# Patient Record
Sex: MALE | Race: WHITE | Employment: OTHER | URBAN - NONMETROPOLITAN AREA
[De-identification: names, ages, dates, MRNs, and addresses within clinical notes are randomized per-mention and may not be internally consistent; named-entity substitution may affect disease eponyms.]

---

## 2021-08-12 ENCOUNTER — HOSPITAL ENCOUNTER (OUTPATIENT)
Age: 71
Setting detail: SPECIMEN
Discharge: HOME | End: 2021-08-12
Payer: MEDICARE

## 2021-08-12 LAB
ALBUMIN SERPL-MCNC: 2.5 G/DL (ref 3.5–5.2)
ALBUMIN/GLOBULIN RATIO: 0.8 (ref 1–2.5)
ALP BLD-CCNC: 147 U/L (ref 40–129)
ALT SERPL-CCNC: 13 U/L (ref 5–41)
ANION GAP SERPL CALCULATED.3IONS-SCNC: 11 MMOL/L (ref 9–17)
AST SERPL-CCNC: 19 U/L
BILIRUB SERPL-MCNC: 0.42 MG/DL (ref 0.3–1.2)
BUN BLDV-MCNC: 11 MG/DL (ref 8–23)
BUN/CREAT BLD: 14 (ref 9–20)
CALCIUM SERPL-MCNC: 7.4 MG/DL (ref 8.6–10.4)
CHLORIDE BLD-SCNC: 98 MMOL/L (ref 98–107)
CO2: 22 MMOL/L (ref 20–31)
CREAT SERPL-MCNC: 0.81 MG/DL (ref 0.7–1.2)
GFR AFRICAN AMERICAN: >60 ML/MIN
GFR NON-AFRICAN AMERICAN: >60 ML/MIN
GFR SERPL CREATININE-BSD FRML MDRD: ABNORMAL ML/MIN/{1.73_M2}
GFR SERPL CREATININE-BSD FRML MDRD: ABNORMAL ML/MIN/{1.73_M2}
GLUCOSE BLD-MCNC: 112 MG/DL (ref 70–99)
POTASSIUM SERPL-SCNC: 4.1 MMOL/L (ref 3.7–5.3)
SODIUM BLD-SCNC: 131 MMOL/L (ref 135–144)
TOTAL PROTEIN: 5.6 G/DL (ref 6.4–8.3)

## 2021-08-12 PROCEDURE — 80053 COMPREHEN METABOLIC PANEL: CPT

## 2021-08-13 ENCOUNTER — HOSPITAL ENCOUNTER (OUTPATIENT)
Age: 71
Setting detail: SPECIMEN
Discharge: HOME | End: 2021-08-13
Payer: MEDICARE

## 2021-08-13 LAB
DATE, STOOL #1: ABNORMAL
DATE, STOOL #2: ABNORMAL
DATE, STOOL #3: ABNORMAL
HEMOCCULT SP1 STL QL: POSITIVE
HEMOCCULT SP2 STL QL: ABNORMAL
HEMOCCULT SP3 STL QL: ABNORMAL
TIME, STOOL #1: 905
TIME, STOOL #2: ABNORMAL
TIME, STOOL #3: ABNORMAL

## 2021-08-13 PROCEDURE — 82272 OCCULT BLD FECES 1-3 TESTS: CPT

## 2021-08-16 ENCOUNTER — HOSPITAL ENCOUNTER (OUTPATIENT)
Age: 71
Setting detail: SPECIMEN
Discharge: HOME | End: 2021-08-16
Payer: MEDICARE

## 2021-08-16 LAB
ABSOLUTE EOS #: 0.18 K/UL (ref 0–0.44)
ABSOLUTE IMMATURE GRANULOCYTE: 0.09 K/UL (ref 0–0.3)
ABSOLUTE LYMPH #: 0.9 K/UL (ref 1.1–3.7)
ABSOLUTE MONO #: 0.09 K/UL (ref 0.1–1.2)
ALBUMIN SERPL-MCNC: 2.4 G/DL (ref 3.5–5.2)
ALBUMIN/GLOBULIN RATIO: 0.8 (ref 1–2.5)
ALP BLD-CCNC: 113 U/L (ref 40–129)
ALT SERPL-CCNC: 12 U/L (ref 5–41)
ANION GAP SERPL CALCULATED.3IONS-SCNC: 12 MMOL/L (ref 9–17)
AST SERPL-CCNC: 15 U/L
BASOPHILS # BLD: 2 % (ref 0–2)
BASOPHILS ABSOLUTE: 0.18 K/UL (ref 0–0.2)
BILIRUB SERPL-MCNC: 0.27 MG/DL (ref 0.3–1.2)
BUN BLDV-MCNC: 9 MG/DL (ref 8–23)
BUN/CREAT BLD: 10 (ref 9–20)
CALCIUM SERPL-MCNC: 7.7 MG/DL (ref 8.6–10.4)
CHLORIDE BLD-SCNC: 97 MMOL/L (ref 98–107)
CO2: 22 MMOL/L (ref 20–31)
CREAT SERPL-MCNC: 0.88 MG/DL (ref 0.7–1.2)
DIFFERENTIAL TYPE: ABNORMAL
EOSINOPHILS RELATIVE PERCENT: 2 % (ref 1–4)
GFR AFRICAN AMERICAN: >60 ML/MIN
GFR NON-AFRICAN AMERICAN: >60 ML/MIN
GFR SERPL CREATININE-BSD FRML MDRD: ABNORMAL ML/MIN/{1.73_M2}
GFR SERPL CREATININE-BSD FRML MDRD: ABNORMAL ML/MIN/{1.73_M2}
GLUCOSE BLD-MCNC: 97 MG/DL (ref 70–99)
HCT VFR BLD CALC: 23.8 % (ref 40.7–50.3)
HEMOGLOBIN: 7.7 G/DL (ref 13–17)
IMMATURE GRANULOCYTES: 1 %
LYMPHOCYTES # BLD: 10 % (ref 24–43)
MCH RBC QN AUTO: 29.2 PG (ref 25.2–33.5)
MCHC RBC AUTO-ENTMCNC: 32.4 G/DL (ref 28.4–34.8)
MCV RBC AUTO: 90.2 FL (ref 82.6–102.9)
MONOCYTES # BLD: 1 % (ref 3–12)
MORPHOLOGY: NORMAL
NRBC AUTOMATED: 0 PER 100 WBC
PDW BLD-RTO: 14.5 % (ref 11.8–14.4)
PLATELET # BLD: 345 K/UL (ref 138–453)
PLATELET ESTIMATE: ABNORMAL
PMV BLD AUTO: 9.1 FL (ref 8.1–13.5)
POTASSIUM SERPL-SCNC: 3.7 MMOL/L (ref 3.7–5.3)
RBC # BLD: 2.64 M/UL (ref 4.21–5.77)
RBC # BLD: ABNORMAL 10*6/UL
SEG NEUTROPHILS: 84 % (ref 36–65)
SEGMENTED NEUTROPHILS ABSOLUTE COUNT: 7.56 K/UL (ref 1.5–8.1)
SODIUM BLD-SCNC: 131 MMOL/L (ref 135–144)
TOTAL PROTEIN: 5.4 G/DL (ref 6.4–8.3)
WBC # BLD: 9 K/UL (ref 3.5–11.3)
WBC # BLD: ABNORMAL 10*3/UL

## 2021-08-16 PROCEDURE — 36415 COLL VENOUS BLD VENIPUNCTURE: CPT

## 2021-08-16 PROCEDURE — P9604 ONE-WAY ALLOW PRORATED TRIP: HCPCS

## 2021-08-16 PROCEDURE — 80053 COMPREHEN METABOLIC PANEL: CPT

## 2021-08-16 PROCEDURE — 85025 COMPLETE CBC W/AUTO DIFF WBC: CPT

## 2021-08-17 ENCOUNTER — TELEPHONE (OUTPATIENT)
Dept: UROLOGY | Age: 71
End: 2021-08-17

## 2021-08-17 PROBLEM — E78.49 OTHER HYPERLIPIDEMIA: Status: ACTIVE | Noted: 2021-08-17

## 2021-08-17 PROBLEM — E03.8 OTHER SPECIFIED HYPOTHYROIDISM: Status: ACTIVE | Noted: 2021-08-17

## 2021-08-17 PROBLEM — H40.1111 PRIMARY OPEN-ANGLE GLAUCOMA, RIGHT EYE, MILD STAGE: Status: ACTIVE | Noted: 2021-08-17

## 2021-08-17 PROBLEM — G89.18 OTHER ACUTE POSTPROCEDURAL PAIN: Status: ACTIVE | Noted: 2021-08-17

## 2021-08-17 PROBLEM — D64.9 ANEMIA, UNSPECIFIED: Status: ACTIVE | Noted: 2021-08-17

## 2021-08-17 PROBLEM — E87.1 HYPONATREMIA: Status: ACTIVE | Noted: 2021-08-17

## 2021-08-17 PROBLEM — R33.9 URINARY RETENTION: Status: ACTIVE | Noted: 2021-08-17

## 2021-08-17 PROBLEM — I48.20 CHRONIC ATRIAL FIBRILLATION (HCC): Status: ACTIVE | Noted: 2021-08-17

## 2021-08-17 PROBLEM — I10 ESSENTIAL HYPERTENSION: Status: ACTIVE | Noted: 2021-08-17

## 2021-08-17 PROBLEM — I71.40 ABDOMINAL AORTIC ANEURYSM (AAA) WITHOUT RUPTURE: Status: ACTIVE | Noted: 2021-08-17

## 2021-08-17 PROBLEM — M62.81 GENERALIZED MUSCLE WEAKNESS: Status: ACTIVE | Noted: 2021-08-17

## 2021-08-17 PROBLEM — I25.9 CHRONIC ISCHEMIC HEART DISEASE: Status: ACTIVE | Noted: 2021-08-17

## 2021-08-17 NOTE — TELEPHONE ENCOUNTER
Rcvd new patient referral from [facility], appointment made for 8/31/2021

## 2021-08-18 RX ORDER — CARVEDILOL 3.12 MG/1
3.12 TABLET ORAL 2 TIMES DAILY WITH MEALS
COMMUNITY

## 2021-08-18 RX ORDER — ACETAMINOPHEN 325 MG/1
650 TABLET ORAL EVERY 4 HOURS PRN
COMMUNITY

## 2021-08-18 RX ORDER — LUTEIN 6 MG
20 TABLET ORAL DAILY
COMMUNITY

## 2021-08-18 RX ORDER — LISINOPRIL 10 MG/1
10 TABLET ORAL DAILY
COMMUNITY

## 2021-08-18 RX ORDER — EVOLOCUMAB 140 MG/ML
140 INJECTION, SOLUTION SUBCUTANEOUS
COMMUNITY
Start: 2021-08-10

## 2021-08-18 RX ORDER — LORATADINE 10 MG/1
10 TABLET ORAL DAILY
COMMUNITY
End: 2021-09-23

## 2021-08-18 RX ORDER — NITROGLYCERIN 0.4 MG/1
0.4 TABLET SUBLINGUAL EVERY 5 MIN PRN
COMMUNITY

## 2021-08-18 RX ORDER — TURMERIC 400 MG
400 CAPSULE ORAL DAILY
COMMUNITY

## 2021-08-18 RX ORDER — APIXABAN 5 MG/1
5 TABLET, FILM COATED ORAL 2 TIMES DAILY
COMMUNITY
Start: 2021-05-25

## 2021-08-18 RX ORDER — DORZOLAMIDE HYDROCHLORIDE AND TIMOLOL MALEATE 20; 5 MG/ML; MG/ML
SOLUTION/ DROPS OPHTHALMIC
COMMUNITY
Start: 2021-06-30

## 2021-08-18 RX ORDER — BRIMONIDINE TARTRATE 2 MG/ML
SOLUTION/ DROPS OPHTHALMIC 3 TIMES DAILY
COMMUNITY
Start: 2021-07-05

## 2021-08-18 RX ORDER — LATANOPROST 50 UG/ML
SOLUTION/ DROPS OPHTHALMIC
COMMUNITY
Start: 2021-06-24

## 2021-08-18 RX ORDER — TAMSULOSIN HYDROCHLORIDE 0.4 MG/1
0.4 CAPSULE ORAL DAILY
COMMUNITY
End: 2021-08-31 | Stop reason: SDUPTHER

## 2021-08-18 RX ORDER — DIGOXIN 125 MCG
125 TABLET ORAL DAILY
COMMUNITY
End: 2021-11-04

## 2021-08-18 RX ORDER — LEVOTHYROXINE SODIUM 150 MCG
150 TABLET ORAL DAILY
COMMUNITY
Start: 2021-08-09

## 2021-08-18 RX ORDER — FOLIC ACID 1 MG/1
1 TABLET ORAL DAILY
COMMUNITY

## 2021-08-18 RX ORDER — LOPERAMIDE HYDROCHLORIDE 2 MG/1
2 CAPSULE ORAL 3 TIMES DAILY PRN
COMMUNITY
End: 2022-01-26 | Stop reason: ALTCHOICE

## 2021-08-31 ENCOUNTER — HOSPITAL ENCOUNTER (OUTPATIENT)
Age: 71
Setting detail: SPECIMEN
Discharge: HOME | End: 2021-08-31
Payer: MEDICARE

## 2021-08-31 ENCOUNTER — OFFICE VISIT (OUTPATIENT)
Dept: UROLOGY | Age: 71
End: 2021-08-31
Payer: MEDICARE

## 2021-08-31 VITALS — SYSTOLIC BLOOD PRESSURE: 110 MMHG | TEMPERATURE: 98.7 F | WEIGHT: 169 LBS | DIASTOLIC BLOOD PRESSURE: 68 MMHG

## 2021-08-31 DIAGNOSIS — R33.9 URINARY RETENTION: ICD-10-CM

## 2021-08-31 DIAGNOSIS — R33.9 URINARY RETENTION: Primary | ICD-10-CM

## 2021-08-31 DIAGNOSIS — N40.1 BENIGN PROSTATIC HYPERPLASIA WITH URINARY RETENTION: ICD-10-CM

## 2021-08-31 DIAGNOSIS — K59.09 OTHER CONSTIPATION: ICD-10-CM

## 2021-08-31 DIAGNOSIS — R33.8 BENIGN PROSTATIC HYPERPLASIA WITH URINARY RETENTION: ICD-10-CM

## 2021-08-31 PROCEDURE — 87186 SC STD MICRODIL/AGAR DIL: CPT

## 2021-08-31 PROCEDURE — 99204 OFFICE O/P NEW MOD 45 MIN: CPT | Performed by: PHYSICIAN ASSISTANT

## 2021-08-31 PROCEDURE — 87086 URINE CULTURE/COLONY COUNT: CPT

## 2021-08-31 PROCEDURE — 86403 PARTICLE AGGLUT ANTBDY SCRN: CPT

## 2021-08-31 RX ORDER — TAMSULOSIN HYDROCHLORIDE 0.4 MG/1
0.4 CAPSULE ORAL 2 TIMES DAILY
Qty: 60 CAPSULE | Refills: 3 | Status: SHIPPED | OUTPATIENT
Start: 2021-08-31 | End: 2021-09-23 | Stop reason: SDUPTHER

## 2021-08-31 RX ORDER — LANOLIN ALCOHOL/MO/W.PET/CERES
325 CREAM (GRAM) TOPICAL
COMMUNITY

## 2021-08-31 RX ORDER — CALCIUM GLUCONATE 45(500) MG
500 TABLET ORAL DAILY
COMMUNITY

## 2021-08-31 RX ORDER — POLYETHYLENE GLYCOL 3350 17 G/17G
17 POWDER, FOR SOLUTION ORAL DAILY
COMMUNITY
End: 2022-01-26 | Stop reason: ALTCHOICE

## 2021-08-31 ASSESSMENT — ENCOUNTER SYMPTOMS
COLOR CHANGE: 0
BACK PAIN: 0
COUGH: 0
SHORTNESS OF BREATH: 0
EYE REDNESS: 0
CONSTIPATION: 1
NAUSEA: 0
VOMITING: 0
WHEEZING: 0
ABDOMINAL PAIN: 0

## 2021-08-31 NOTE — PROGRESS NOTES
HPI:      Patient is a 66 y.o. male in no acute distress. He is alert and oriented to person, place, and time. Patient is a new patient referral from [facility] for urinary retention. Patient has never been seen by urology prior to this. He denies any history of kidney stones. Patient has a history of hypertension, hyperlipidemia, coronary artery disease with drug-eluting stent placement 1/21/2021. Patient had a AAA repair with an endovascular graft 6/2020. Patient had subsequent stenting done on 3/30/2021. Patient continued to have an endoleak. On 7/29/2021 he had an open explantation of the endovascular aneurysm repair with redo AAA repair. Postoperatively he did develop urinary retention and Hurst catheter was placed. He was started on Flomax at the time. (Patient reports that for prior surgery he was started on Flomax but stopped it when he got home as he felt it caused more frequency.)  He was ultimately discharged to [facility]. At [facility] they did make an attempt to remove his catheter on 8/12/2021 but he was unable to urinate therefore the catheter was replaced on 8/13/2021. Patient was recently discharged from [address] with catheter in place on 8/21/2021. He states that he has occasional hematuria in the catheter bag but the catheter is draining well. Patient has not have a bowel movement every day. Patient states that prior to recent procedures he had nocturia x2 and some post void dribbling but this was not bothersome for him.       Past Medical History:   Diagnosis Date    Abdominal aortic aneurysm (AAA) without rupture (HCC)     Anemia, unspecified     Aortoiliac occlusive disease (HCC)     Atrial fibrillation (HCC)     Chronic ischemic heart disease, unspecified     Essential hypertension     Generalized muscle weakness     Graves disease     Hx of thyroid irradiation     Hypo-osmolality and hyponatremia  Hypothyroidism, unspecified     Other acute postprocedural pain     Other hyperlipidemia     Primary open angle glaucoma (POAG) of right eye, mild stage     Primary open angle glaucoma of right eye, mild stage     Urinary retention      Past Surgical History:   Procedure Laterality Date    ABDOMINAL AORTIC ANEURYSM REPAIR, ENDOVASCULAR  06/2020    APPENDECTOMY  2017    CAROTID ENDARTERECTOMY Right 2017    CORONARY ANGIOPLASTY WITH STENT PLACEMENT  01/2021    CC/TEDDY placed    EYE SURGERY Right 10/05/2020    Avastin (Bevacizumab) 1.25mg intravitreal injection OD (right eye)    EYE SURGERY Right 11/09/2020    Panretinal photocoagulation (PRP) OD (right eye)    HERNIA REPAIR      PACEMAKER PLACEMENT  2020    PPM and AICD     Outpatient Encounter Medications as of 8/31/2021   Medication Sig Dispense Refill    calcium gluconate 500 MG tablet Take 500 mg by mouth daily      ferrous sulfate (FE TABS 325) 325 (65 Fe) MG EC tablet Take 325 mg by mouth daily (with breakfast)      polyethylene glycol (MIRALAX) 17 g PACK packet Take 17 g by mouth daily      tamsulosin (FLOMAX) 0.4 MG capsule Take 1 capsule by mouth 2 times daily 60 capsule 3    ELIQUIS 5 MG TABS tablet Take 5 mg by mouth 2 times daily      brimonidine (ALPHAGAN) 0.2 % ophthalmic solution 3 times daily 1 drop in right eye three times daily      dorzolamide-timolol (COSOPT) 22.3-6.8 MG/ML ophthalmic solution 1 drop in right eye twice daily      REPATHA SURECLICK 620 MG/ML SOAJ Inject 140 mg into the skin every 14 days      latanoprost (XALATAN) 0.005 % ophthalmic solution 1 drop right eye at bedtime      SYNTHROID 150 MCG tablet Take 150 mcg by mouth daily      lisinopril (PRINIVIL;ZESTRIL) 10 MG tablet Take 10 mg by mouth daily      loratadine (CLARITIN) 10 MG tablet Take 10 mg by mouth daily      acetaminophen (TYLENOL) 325 MG tablet Take 650 mg by mouth every 4 hours as needed for Pain      Coenzyme Q10-Vitamin E (COQ10-VITAMIN E) 100-10 MG-UNIT CAPS Take 400 mg by mouth daily      Lactobacillus (PROBIOTIC ACIDOPHILUS PO) Take by mouth daily 15 Billion cell cap daily      Turmeric 400 MG CAPS Take 400 mg by mouth daily      ASPIRIN PO Take 81 mg by mouth daily      loperamide (IMODIUM) 2 MG capsule Take 2 mg by mouth 3 times daily as needed for Diarrhea      SODIUM CHLORIDE PO Take 1 g by mouth 3 times daily      carvedilol (COREG) 3.125 MG tablet Take 3.125 mg by mouth 2 times daily (with meals)      digoxin (LANOXIN) 125 MCG tablet Take 125 mcg by mouth daily      folic acid (FOLVITE) 1 MG tablet Take 1 mg by mouth daily      Multiple Vitamin (MULTIVITAMIN ADULT PO) Take by mouth daily      Lutein 20 MG TABS Take 20 mg by mouth daily      nitroGLYCERIN (NITROSTAT) 0.4 MG SL tablet Place 0.4 mg under the tongue every 5 minutes as needed for Chest pain up to max of 3 total doses. If no relief after 1 dose, call 911. (Patient not taking: Reported on 8/31/2021)      [DISCONTINUED] tamsulosin (FLOMAX) 0.4 MG capsule Take 0.4 mg by mouth daily       No facility-administered encounter medications on file as of 8/31/2021.       Current Outpatient Medications on File Prior to Visit   Medication Sig Dispense Refill    calcium gluconate 500 MG tablet Take 500 mg by mouth daily      ferrous sulfate (FE TABS 325) 325 (65 Fe) MG EC tablet Take 325 mg by mouth daily (with breakfast)      polyethylene glycol (MIRALAX) 17 g PACK packet Take 17 g by mouth daily      ELIQUIS 5 MG TABS tablet Take 5 mg by mouth 2 times daily      brimonidine (ALPHAGAN) 0.2 % ophthalmic solution 3 times daily 1 drop in right eye three times daily      dorzolamide-timolol (COSOPT) 22.3-6.8 MG/ML ophthalmic solution 1 drop in right eye twice daily      REPATHA SURECLICK 483 MG/ML SOAJ Inject 140 mg into the skin every 14 days      latanoprost (XALATAN) 0.005 % ophthalmic solution 1 drop right eye at bedtime      SYNTHROID 150 MCG tablet Take 150 mcg by mouth daily      lisinopril (PRINIVIL;ZESTRIL) 10 MG tablet Take 10 mg by mouth daily      loratadine (CLARITIN) 10 MG tablet Take 10 mg by mouth daily      acetaminophen (TYLENOL) 325 MG tablet Take 650 mg by mouth every 4 hours as needed for Pain      Coenzyme Q10-Vitamin E (COQ10-VITAMIN E) 100-10 MG-UNIT CAPS Take 400 mg by mouth daily      Lactobacillus (PROBIOTIC ACIDOPHILUS PO) Take by mouth daily 15 Billion cell cap daily      Turmeric 400 MG CAPS Take 400 mg by mouth daily      ASPIRIN PO Take 81 mg by mouth daily      loperamide (IMODIUM) 2 MG capsule Take 2 mg by mouth 3 times daily as needed for Diarrhea      SODIUM CHLORIDE PO Take 1 g by mouth 3 times daily      carvedilol (COREG) 3.125 MG tablet Take 3.125 mg by mouth 2 times daily (with meals)      digoxin (LANOXIN) 125 MCG tablet Take 125 mcg by mouth daily      folic acid (FOLVITE) 1 MG tablet Take 1 mg by mouth daily      Multiple Vitamin (MULTIVITAMIN ADULT PO) Take by mouth daily      Lutein 20 MG TABS Take 20 mg by mouth daily      nitroGLYCERIN (NITROSTAT) 0.4 MG SL tablet Place 0.4 mg under the tongue every 5 minutes as needed for Chest pain up to max of 3 total doses. If no relief after 1 dose, call 911. (Patient not taking: Reported on 8/31/2021)       No current facility-administered medications on file prior to visit. Glucagon and Ferrous sulfate  History reviewed. No pertinent family history. Social History     Tobacco Use   Smoking Status Former Smoker    Types: Cigarettes   Smokeless Tobacco Never Used       Social History     Substance and Sexual Activity   Alcohol Use Yes    Comment: 2 mixed drinks a week       Review of Systems   Constitutional: Negative for appetite change, chills and fever. Eyes: Negative for redness and visual disturbance. Respiratory: Negative for cough, shortness of breath and wheezing. Cardiovascular: Negative for chest pain and leg swelling. Gastrointestinal: Positive for constipation. Negative for abdominal pain, nausea and vomiting. Genitourinary: Positive for difficulty urinating. Negative for decreased urine volume, discharge, dysuria, enuresis, flank pain, frequency, hematuria, penile pain, scrotal swelling, testicular pain and urgency. Musculoskeletal: Negative for back pain, joint swelling and myalgias. Skin: Negative for color change, rash and wound. Neurological: Negative for dizziness, tremors and numbness. Hematological: Negative for adenopathy. Does not bruise/bleed easily. /68 (Site: Right Upper Arm, Position: Sitting, Cuff Size: Large Adult) Comment: manual  Temp 98.7 °F (37.1 °C) (Temporal)   Wt 169 lb (76.7 kg)       PHYSICAL EXAM:  Constitutional: Patient in no acute distress; Neuro: alert and oriented to person place and time. Psych: Mood and affect normal.  Lungs: Respiratory effort normal  Abdomen: Soft, non-tender, non-distended  Rectal: deferred       Lab Results   Component Value Date    BUN 9 08/16/2021     Lab Results   Component Value Date    CREATININE 0.88 08/16/2021     No results found for: PSA    ASSESSMENT:   Diagnosis Orders   1. Urinary retention  Culture, Urine   2. Benign prostatic hyperplasia with urinary retention  Culture, Urine   3. Other constipation         PLAN:  Increase Flomax to twice a day    Start MiraLAX every day-instructions given    Maintain Hurst catheter for now    Follow-up in 1 week for Hurst catheter removal.  They are aware if the Hurst catheter is not able to stay out secondary to retention we would need to schedule her for a cystoscopy in the future.

## 2021-08-31 NOTE — PATIENT INSTRUCTIONS
FOR CONSTIPATION    It is very important to have regular, soft, daily bowel movements, because it WILL improve your urinary symptoms. Take Miralax (or generic equivalent) 17g once daily (one capful). Take every day, DO NOT skip days, as it must be taken daily in order to be effective. DO NOT take just \"as needed\". It is safe to take long term and is recommended for your symptoms. If one dose daily causes loose stools/diarrhea, decrease to 1/2 capful or 1/4 capful. If you cannot tolerate this medication, please notify our office. If one dose daily of Miralax is not sufficient to produce a soft, easy to pass daily stool, you may also add an over-the-counter stool softener capsule. For example: colace (docusate). For Miralax to have maximal effectiveness, be sure to increase your water intake - aim for 80 oz daily unless you are on a fluid-restriction from another provider. SURVEY:    You may be receiving a survey from [employer] regarding your visit today. Please complete the survey to enable us to provide the highest quality of care to you and your family. If you cannot score us a very good on any question, please call the office to discuss how we could have made your experience a very good one. Thank you.     Your MA today: Harini Waller

## 2021-09-02 ENCOUNTER — TELEPHONE (OUTPATIENT)
Dept: UROLOGY | Age: 71
End: 2021-09-02

## 2021-09-02 LAB
CULTURE: ABNORMAL
Lab: ABNORMAL
SPECIMEN DESCRIPTION: ABNORMAL

## 2021-09-02 RX ORDER — NITROFURANTOIN 25; 75 MG/1; MG/1
100 CAPSULE ORAL 2 TIMES DAILY
Qty: 20 CAPSULE | Refills: 0 | Status: SHIPPED | OUTPATIENT
Start: 2021-09-02 | End: 2021-09-12

## 2021-09-02 NOTE — TELEPHONE ENCOUNTER
Tried to contact the patient multiple times, phone busy. Try again Friday.    (Patient was previously advised that we may not call until Friday)

## 2021-09-02 NOTE — TELEPHONE ENCOUNTER
UACS is positive for infection. I sent in culture specific macrobid. Take this antibiotic until gone. Take this with food and eat yogurt once per day to prevent GI upset. If you develop nausea, vomiting, or fevers call the office or go to the ER. If your urinary symptoms do not improve once completing the antibiotics call our office.

## 2021-09-02 NOTE — TELEPHONE ENCOUNTER
Patient was advised of results/response. He notes that he has been taking the miralax. He has developed severe diarrhea with it. He has decreased his dosage and it currently taking only a quarter of what he is take and he feels that the diarrhea is even worse than yesterday. He is questioning if he should even continue to take it.

## 2021-09-07 ENCOUNTER — OFFICE VISIT (OUTPATIENT)
Dept: UROLOGY | Age: 71
End: 2021-09-07
Payer: MEDICARE

## 2021-09-07 VITALS
DIASTOLIC BLOOD PRESSURE: 82 MMHG | BODY MASS INDEX: 25.61 KG/M2 | TEMPERATURE: 97.3 F | HEIGHT: 68 IN | SYSTOLIC BLOOD PRESSURE: 122 MMHG | WEIGHT: 169 LBS

## 2021-09-07 DIAGNOSIS — R33.8 BENIGN PROSTATIC HYPERPLASIA WITH URINARY RETENTION: ICD-10-CM

## 2021-09-07 DIAGNOSIS — R33.9 URINARY RETENTION: Primary | ICD-10-CM

## 2021-09-07 DIAGNOSIS — N40.1 BENIGN PROSTATIC HYPERPLASIA WITH URINARY RETENTION: ICD-10-CM

## 2021-09-07 PROCEDURE — 51798 US URINE CAPACITY MEASURE: CPT | Performed by: PHYSICIAN ASSISTANT

## 2021-09-07 PROCEDURE — 99213 OFFICE O/P EST LOW 20 MIN: CPT | Performed by: PHYSICIAN ASSISTANT

## 2021-09-07 ASSESSMENT — ENCOUNTER SYMPTOMS
VOMITING: 0
BACK PAIN: 0
DIARRHEA: 1
EYE REDNESS: 0
COUGH: 0
SHORTNESS OF BREATH: 0
CONSTIPATION: 0
ABDOMINAL PAIN: 0
COLOR CHANGE: 0
NAUSEA: 0
WHEEZING: 0

## 2021-09-07 NOTE — PROGRESS NOTES
Patient returned to the office later with complaints of unable to urinate since leaving the office.      Random bladderscan performed in office today:  247 ml

## 2021-09-07 NOTE — PROGRESS NOTES
Pt had de la fuente catheter placed [facility]  for Urinary retention. Balloon deflated on catheter and catheter removed. Patient tolerated procedure well. Pt instructed to drink plenty of fluids, try to urinate q 2 hrs, call office in 6 hrs with update of amount voided, and if not voiding will need to return to office to have de la fuente replaced. Also instructed to return to office or ER if goes >6 hrs without voiding or has strong urge to void/suprapubic discomfort and cannot urinate. Pt verbalizes understanding of plan. F/u 2 weeks.

## 2021-09-07 NOTE — PROGRESS NOTES
HPI:      Patient is a 66 y.o. male in no acute distress. He is alert and oriented to person, place, and time. 8/31/2021   Patient is a new patient referral from [facility] for urinary retention. Patient has never been seen by urology prior to this. He denies any history of kidney stones. Patient has a history of hypertension, hyperlipidemia, coronary artery disease with drug-eluting stent placement 1/21/2021. Patient had a AAA repair with an endovascular graft 6/2020. Patient had subsequent stenting done on 3/30/2021. Patient continued to have an endoleak. On 7/29/2021 he had an open explantation of the endovascular aneurysm repair with redo AAA repair. Postoperatively he did develop urinary retention and Hurst catheter was placed. He was started on Flomax at the time. (Patient reports that for prior surgery he was started on Flomax but stopped it when he got home as he felt it caused more frequency.)  He was ultimately discharged to [facility]. At [facility] they did make an attempt to remove his catheter on 8/12/2021 but he was unable to urinate therefore the catheter was replaced on 8/13/2021. Patient was recently discharged from [address] with catheter in place on 8/21/2021. He states that he has occasional hematuria in the catheter bag but the catheter is draining well. Patient has not have a bowel movement every day. Patient states that prior to recent procedures he had nocturia x2 and some post void dribbling but this was not bothersome for him. Today:  Patient is here today for follow-up urinary retention and BPH. At last visit we increased Flomax twice a day. We also started him on MiraLAX. Patient did have diarrhea with full dose of MiraLAX and was told to reduce it. Patient did reduce it down to a quarter cap but continue to have diarrhea therefore they stopped the medication.   Patient did have a positive urinary tract infection and is currently being treated with culture specific antibiotics. Patient's Hurst catheter was removed today. He denies any fever or chills.     Past Medical History:   Diagnosis Date    Abdominal aortic aneurysm (AAA) without rupture (HCC)     Anemia, unspecified     Aortoiliac occlusive disease (HCC)     Atrial fibrillation (HCC)     Chronic ischemic heart disease, unspecified     Essential hypertension     Generalized muscle weakness     Graves disease     Hx of thyroid irradiation     Hypo-osmolality and hyponatremia     Hypothyroidism, unspecified     Other acute postprocedural pain     Other hyperlipidemia     Primary open angle glaucoma (POAG) of right eye, mild stage     Primary open angle glaucoma of right eye, mild stage     Urinary retention      Past Surgical History:   Procedure Laterality Date    ABDOMINAL AORTIC ANEURYSM REPAIR, ENDOVASCULAR  06/2020    APPENDECTOMY  2017    CAROTID ENDARTERECTOMY Right 2017    CORONARY ANGIOPLASTY WITH STENT PLACEMENT  01/2021    CC/TEDDY placed    EYE SURGERY Right 10/05/2020    Avastin (Bevacizumab) 1.25mg intravitreal injection OD (right eye)    EYE SURGERY Right 11/09/2020    Panretinal photocoagulation (PRP) OD (right eye)    HERNIA REPAIR      PACEMAKER PLACEMENT  2020    PPM and AICD     Outpatient Encounter Medications as of 9/7/2021   Medication Sig Dispense Refill    mupirocin (BACTROBAN) 2 % ointment Apply topically 3 times daily      nitrofurantoin, macrocrystal-monohydrate, (MACROBID) 100 MG capsule Take 1 capsule by mouth 2 times daily for 10 days 20 capsule 0    calcium gluconate 500 MG tablet Take 500 mg by mouth daily      ferrous sulfate (FE TABS 325) 325 (65 Fe) MG EC tablet Take 325 mg by mouth daily (with breakfast)      tamsulosin (FLOMAX) 0.4 MG capsule Take 1 capsule by mouth 2 times daily 60 capsule 3    ELIQUIS 5 MG TABS tablet Take 5 mg by mouth 2 times daily      brimonidine (ALPHAGAN) 0.2 % ophthalmic solution 3 times daily 1 drop in right eye three times daily      dorzolamide-timolol (COSOPT) 22.3-6.8 MG/ML ophthalmic solution 1 drop in right eye twice daily      REPATHA SURECLICK 637 MG/ML SOAJ Inject 140 mg into the skin every 14 days      latanoprost (XALATAN) 0.005 % ophthalmic solution 1 drop right eye at bedtime      SYNTHROID 150 MCG tablet Take 150 mcg by mouth daily      lisinopril (PRINIVIL;ZESTRIL) 10 MG tablet Take 10 mg by mouth daily      loratadine (CLARITIN) 10 MG tablet Take 10 mg by mouth daily      acetaminophen (TYLENOL) 325 MG tablet Take 650 mg by mouth every 4 hours as needed for Pain      Coenzyme Q10-Vitamin E (COQ10-VITAMIN E) 100-10 MG-UNIT CAPS Take 400 mg by mouth daily      Lactobacillus (PROBIOTIC ACIDOPHILUS PO) Take by mouth daily 15 Billion cell cap daily      Turmeric 400 MG CAPS Take 400 mg by mouth daily      ASPIRIN PO Take 81 mg by mouth daily      loperamide (IMODIUM) 2 MG capsule Take 2 mg by mouth 3 times daily as needed for Diarrhea      SODIUM CHLORIDE PO Take 1 g by mouth 3 times daily      carvedilol (COREG) 3.125 MG tablet Take 3.125 mg by mouth 2 times daily (with meals)      digoxin (LANOXIN) 125 MCG tablet Take 125 mcg by mouth daily      folic acid (FOLVITE) 1 MG tablet Take 1 mg by mouth daily      Multiple Vitamin (MULTIVITAMIN ADULT PO) Take by mouth daily      Lutein 20 MG TABS Take 20 mg by mouth daily      polyethylene glycol (MIRALAX) 17 g PACK packet Take 17 g by mouth daily (Patient not taking: Reported on 9/7/2021)      nitroGLYCERIN (NITROSTAT) 0.4 MG SL tablet Place 0.4 mg under the tongue every 5 minutes as needed for Chest pain up to max of 3 total doses. If no relief after 1 dose, call 911. (Patient not taking: Reported on 8/31/2021)       No facility-administered encounter medications on file as of 9/7/2021.       Current Outpatient Medications on File Prior to Visit   Medication Sig Dispense Refill    mupirocin (BACTROBAN) 2 % ointment Apply topically 3 times daily      nitrofurantoin, macrocrystal-monohydrate, (MACROBID) 100 MG capsule Take 1 capsule by mouth 2 times daily for 10 days 20 capsule 0    calcium gluconate 500 MG tablet Take 500 mg by mouth daily      ferrous sulfate (FE TABS 325) 325 (65 Fe) MG EC tablet Take 325 mg by mouth daily (with breakfast)      tamsulosin (FLOMAX) 0.4 MG capsule Take 1 capsule by mouth 2 times daily 60 capsule 3    ELIQUIS 5 MG TABS tablet Take 5 mg by mouth 2 times daily      brimonidine (ALPHAGAN) 0.2 % ophthalmic solution 3 times daily 1 drop in right eye three times daily      dorzolamide-timolol (COSOPT) 22.3-6.8 MG/ML ophthalmic solution 1 drop in right eye twice daily      REPATHA SURECLICK 772 MG/ML SOAJ Inject 140 mg into the skin every 14 days      latanoprost (XALATAN) 0.005 % ophthalmic solution 1 drop right eye at bedtime      SYNTHROID 150 MCG tablet Take 150 mcg by mouth daily      lisinopril (PRINIVIL;ZESTRIL) 10 MG tablet Take 10 mg by mouth daily      loratadine (CLARITIN) 10 MG tablet Take 10 mg by mouth daily      acetaminophen (TYLENOL) 325 MG tablet Take 650 mg by mouth every 4 hours as needed for Pain      Coenzyme Q10-Vitamin E (COQ10-VITAMIN E) 100-10 MG-UNIT CAPS Take 400 mg by mouth daily      Lactobacillus (PROBIOTIC ACIDOPHILUS PO) Take by mouth daily 15 Billion cell cap daily      Turmeric 400 MG CAPS Take 400 mg by mouth daily      ASPIRIN PO Take 81 mg by mouth daily      loperamide (IMODIUM) 2 MG capsule Take 2 mg by mouth 3 times daily as needed for Diarrhea      SODIUM CHLORIDE PO Take 1 g by mouth 3 times daily      carvedilol (COREG) 3.125 MG tablet Take 3.125 mg by mouth 2 times daily (with meals)      digoxin (LANOXIN) 125 MCG tablet Take 125 mcg by mouth daily      folic acid (FOLVITE) 1 MG tablet Take 1 mg by mouth daily      Multiple Vitamin (MULTIVITAMIN ADULT PO) Take by mouth daily      Lutein 20 MG TABS Take 20 mg by mouth daily      polyethylene glycol (MIRALAX) 17 g PACK packet Take 17 g by mouth daily (Patient not taking: Reported on 2021)      nitroGLYCERIN (NITROSTAT) 0.4 MG SL tablet Place 0.4 mg under the tongue every 5 minutes as needed for Chest pain up to max of 3 total doses. If no relief after 1 dose, call 911. (Patient not taking: Reported on 2021)       No current facility-administered medications on file prior to visit. Glucagon and Ferrous sulfate  No family history on file. Social History     Tobacco Use   Smoking Status Former Smoker    Packs/day: 1.00    Types: Cigarettes    Quit date: 2000    Years since quittin.1   Smokeless Tobacco Never Used       Social History     Substance and Sexual Activity   Alcohol Use Yes    Comment: 2 mixed drinks a week       Review of Systems   Constitutional: Negative for appetite change, chills and fever. Eyes: Negative for redness and visual disturbance. Respiratory: Negative for cough, shortness of breath and wheezing. Cardiovascular: Negative for chest pain and leg swelling. Gastrointestinal: Positive for diarrhea. Negative for abdominal pain, constipation, nausea and vomiting. Genitourinary: Negative for decreased urine volume, difficulty urinating, discharge, dysuria, enuresis, flank pain, frequency, hematuria, penile pain, scrotal swelling, testicular pain and urgency. Musculoskeletal: Negative for back pain, joint swelling and myalgias. Skin: Negative for color change, rash and wound. Neurological: Negative for dizziness, tremors and numbness. Hematological: Negative for adenopathy. Does not bruise/bleed easily. /82 (Site: Right Upper Arm, Position: Sitting, Cuff Size: Large Adult)   Temp 97.3 °F (36.3 °C) (Temporal)   Ht 5' 8\" (1.727 m)   Wt 169 lb (76.7 kg)   BMI 25.70 kg/m²       PHYSICAL EXAM:  Constitutional: Patient in no acute distress; Neuro: alert and oriented to person place and time. Psych: Mood and affect normal.  Lungs: Respiratory effort normal  Abdomen: Soft, non-tender, non-distended  Rectal: Deferred    Lab Results   Component Value Date    BUN 9 08/16/2021     Lab Results   Component Value Date    CREATININE 0.88 08/16/2021     No results found for: PSA    ASSESSMENT:   Diagnosis Orders   1. Urinary retention     2. Benign prostatic hyperplasia with urinary retention           PLAN:  Complete course of antibiotics    Continue Flomax twice a day    · Drink plenty of fluids, aim for 80 oz daily for the next few days. · It is normal to feel a little discomfort the next few times you void. · Try to urinate every 2-3 hours while awake (even if you don't feel like you have to void). · If you DO urinate, please record the amount. · If you do NOT urinate within about 6 hours OR if you feel the strong uncomfortable urge to void but are not able - you'll need to come back to the office to get the catheter replaced. · Either way, call our office around 3:30 pm and let us know the void amounts (if you are urinating) or let us know that you're on your way back to the office (if you are not urinating).      If patient develops constipation he needs to resume MiraLAX at 1 teaspoon a day    Follow-up in 2 weeks with [facility],    Patient is aware if Hurst catheter has to be replaced we need to schedule him for a cystoscopy in order to more fully evaluate him

## 2021-09-08 ENCOUNTER — OFFICE VISIT (OUTPATIENT)
Dept: UROLOGY | Age: 71
End: 2021-09-08
Payer: MEDICARE

## 2021-09-08 VITALS
TEMPERATURE: 97.5 F | BODY MASS INDEX: 25.46 KG/M2 | HEART RATE: 77 BPM | DIASTOLIC BLOOD PRESSURE: 69 MMHG | SYSTOLIC BLOOD PRESSURE: 101 MMHG | WEIGHT: 168 LBS | HEIGHT: 68 IN

## 2021-09-08 DIAGNOSIS — R33.8 BENIGN PROSTATIC HYPERPLASIA WITH URINARY RETENTION: ICD-10-CM

## 2021-09-08 DIAGNOSIS — N40.1 BENIGN PROSTATIC HYPERPLASIA WITH URINARY RETENTION: ICD-10-CM

## 2021-09-08 DIAGNOSIS — R33.9 URINARY RETENTION: Primary | ICD-10-CM

## 2021-09-08 PROCEDURE — 99213 OFFICE O/P EST LOW 20 MIN: CPT | Performed by: PHYSICIAN ASSISTANT

## 2021-09-08 PROCEDURE — 51798 US URINE CAPACITY MEASURE: CPT | Performed by: PHYSICIAN ASSISTANT

## 2021-09-08 ASSESSMENT — ENCOUNTER SYMPTOMS
DIARRHEA: 1
COLOR CHANGE: 0
VOMITING: 0
NAUSEA: 0
ABDOMINAL PAIN: 0
WHEEZING: 0
EYE REDNESS: 0
COUGH: 0
BACK PAIN: 0
SHORTNESS OF BREATH: 0
CONSTIPATION: 0

## 2021-09-08 NOTE — PROGRESS NOTES
HPI:      Patient is a 66 y.o. male in no acute distress. He is alert and oriented to person, place, and time. 8/31/2021   Patient is a new patient referral from [facility] for urinary retention. Patient has never been seen by urology prior to this. He denies any history of kidney stones. Patient has a history of hypertension, hyperlipidemia, coronary artery disease with drug-eluting stent placement 1/21/2021. Patient had a AAA repair with an endovascular graft 6/2020. Patient had subsequent stenting done on 3/30/2021. Patient continued to have an endoleak. On 7/29/2021 he had an open explantation of the endovascular aneurysm repair with redo AAA repair. Postoperatively he did develop urinary retention and Hurst catheter was placed. He was started on Flomax at the time. (Patient reports that for prior surgery he was started on Flomax but stopped it when he got home as he felt it caused more frequency.)  He was ultimately discharged to [facility]. At [facility] they did make an attempt to remove his catheter on 8/12/2021 but he was unable to urinate therefore the catheter was replaced on 8/13/2021. Patient was recently discharged from [address] with catheter in place on 8/21/2021. He states that he has occasional hematuria in the catheter bag but the catheter is draining well. Patient has not have a bowel movement every day. Patient states that prior to recent procedures he had nocturia x2 and some post void dribbling but this was not bothersome for him. Today:  Patient is here today for follow-up urinary retention and BPH. Patient continues take Flomax twice a day. Patient did have a Hurst catheter removed yesterday. He returned to our office yesterday afternoon as he was unable to urinate any significant quantity. Patient was having multiple bouts of diarrhea and he was uncertain whether or not he voided urine at the same time. Yesterday afternoon his random bladder scan was 247 mL. He was completely pain-free. We did offer to see him this morning for repeat bladder scan but he did have multiple appointments at the [facility] clinic. Patient returns this afternoon for PVR. Patient last voided 30 mins ago, scan = 67 mL. Denies any fever, chills, gross hematuria, flank pain, dysuria.     Past Medical History:   Diagnosis Date    Abdominal aortic aneurysm (AAA) without rupture (HCC)     Anemia, unspecified     Aortoiliac occlusive disease (HCC)     Atrial fibrillation (HCC)     Chronic ischemic heart disease, unspecified     Essential hypertension     Generalized muscle weakness     Graves disease     Hx of thyroid irradiation     Hypo-osmolality and hyponatremia     Hypothyroidism, unspecified     Other acute postprocedural pain     Other hyperlipidemia     Primary open angle glaucoma (POAG) of right eye, mild stage     Primary open angle glaucoma of right eye, mild stage     Urinary retention      Past Surgical History:   Procedure Laterality Date    ABDOMINAL AORTIC ANEURYSM REPAIR, ENDOVASCULAR  06/2020    APPENDECTOMY  2017    CAROTID ENDARTERECTOMY Right 2017    CORONARY ANGIOPLASTY WITH STENT PLACEMENT  01/2021    CC/TEDDY placed    EYE SURGERY Right 10/05/2020    Avastin (Bevacizumab) 1.25mg intravitreal injection OD (right eye)    EYE SURGERY Right 11/09/2020    Panretinal photocoagulation (PRP) OD (right eye)    HERNIA REPAIR      PACEMAKER PLACEMENT  2020    PPM and AICD     Outpatient Encounter Medications as of 9/8/2021   Medication Sig Dispense Refill    mupirocin (BACTROBAN) 2 % ointment Apply topically 3 times daily      nitrofurantoin, macrocrystal-monohydrate, (MACROBID) 100 MG capsule Take 1 capsule by mouth 2 times daily for 10 days 20 capsule 0    calcium gluconate 500 MG tablet Take 500 mg by mouth daily      ferrous sulfate (FE TABS 325) 325 (65 Fe) MG EC tablet Take 325 mg by mouth daily (with breakfast)      polyethylene glycol (MIRALAX) 17 g PACK packet Take 17 g by mouth daily       tamsulosin (FLOMAX) 0.4 MG capsule Take 1 capsule by mouth 2 times daily 60 capsule 3    ELIQUIS 5 MG TABS tablet Take 5 mg by mouth 2 times daily      brimonidine (ALPHAGAN) 0.2 % ophthalmic solution 3 times daily 1 drop in right eye three times daily      dorzolamide-timolol (COSOPT) 22.3-6.8 MG/ML ophthalmic solution 1 drop in right eye twice daily      REPATHA SURECLICK 902 MG/ML SOAJ Inject 140 mg into the skin every 14 days      latanoprost (XALATAN) 0.005 % ophthalmic solution 1 drop right eye at bedtime      SYNTHROID 150 MCG tablet Take 150 mcg by mouth daily      lisinopril (PRINIVIL;ZESTRIL) 10 MG tablet Take 10 mg by mouth daily      loratadine (CLARITIN) 10 MG tablet Take 10 mg by mouth daily      acetaminophen (TYLENOL) 325 MG tablet Take 650 mg by mouth every 4 hours as needed for Pain      nitroGLYCERIN (NITROSTAT) 0.4 MG SL tablet Place 0.4 mg under the tongue every 5 minutes as needed for Chest pain up to max of 3 total doses. If no relief after 1 dose, call 911.        Coenzyme Q10-Vitamin E (COQ10-VITAMIN E) 100-10 MG-UNIT CAPS Take 400 mg by mouth daily      Lactobacillus (PROBIOTIC ACIDOPHILUS PO) Take by mouth daily 15 Billion cell cap daily      Turmeric 400 MG CAPS Take 400 mg by mouth daily      ASPIRIN PO Take 81 mg by mouth daily      loperamide (IMODIUM) 2 MG capsule Take 2 mg by mouth 3 times daily as needed for Diarrhea      SODIUM CHLORIDE PO Take 1 g by mouth 3 times daily      carvedilol (COREG) 3.125 MG tablet Take 3.125 mg by mouth 2 times daily (with meals)      digoxin (LANOXIN) 125 MCG tablet Take 125 mcg by mouth daily      folic acid (FOLVITE) 1 MG tablet Take 1 mg by mouth daily      Multiple Vitamin (MULTIVITAMIN ADULT PO) Take by mouth daily      Lutein 20 MG TABS Take 20 mg by mouth daily       No facility-administered encounter medications on file as of 9/8/2021. Current Outpatient Medications on File Prior to Visit   Medication Sig Dispense Refill    mupirocin (BACTROBAN) 2 % ointment Apply topically 3 times daily      nitrofurantoin, macrocrystal-monohydrate, (MACROBID) 100 MG capsule Take 1 capsule by mouth 2 times daily for 10 days 20 capsule 0    calcium gluconate 500 MG tablet Take 500 mg by mouth daily      ferrous sulfate (FE TABS 325) 325 (65 Fe) MG EC tablet Take 325 mg by mouth daily (with breakfast)      polyethylene glycol (MIRALAX) 17 g PACK packet Take 17 g by mouth daily       tamsulosin (FLOMAX) 0.4 MG capsule Take 1 capsule by mouth 2 times daily 60 capsule 3    ELIQUIS 5 MG TABS tablet Take 5 mg by mouth 2 times daily      brimonidine (ALPHAGAN) 0.2 % ophthalmic solution 3 times daily 1 drop in right eye three times daily      dorzolamide-timolol (COSOPT) 22.3-6.8 MG/ML ophthalmic solution 1 drop in right eye twice daily      REPATHA SURECLICK 625 MG/ML SOAJ Inject 140 mg into the skin every 14 days      latanoprost (XALATAN) 0.005 % ophthalmic solution 1 drop right eye at bedtime      SYNTHROID 150 MCG tablet Take 150 mcg by mouth daily      lisinopril (PRINIVIL;ZESTRIL) 10 MG tablet Take 10 mg by mouth daily      loratadine (CLARITIN) 10 MG tablet Take 10 mg by mouth daily      acetaminophen (TYLENOL) 325 MG tablet Take 650 mg by mouth every 4 hours as needed for Pain      nitroGLYCERIN (NITROSTAT) 0.4 MG SL tablet Place 0.4 mg under the tongue every 5 minutes as needed for Chest pain up to max of 3 total doses. If no relief after 1 dose, call 911.        Coenzyme Q10-Vitamin E (COQ10-VITAMIN E) 100-10 MG-UNIT CAPS Take 400 mg by mouth daily      Lactobacillus (PROBIOTIC ACIDOPHILUS PO) Take by mouth daily 15 Billion cell cap daily      Turmeric 400 MG CAPS Take 400 mg by mouth daily      ASPIRIN PO Take 81 mg by mouth daily      loperamide (IMODIUM) 2 MG capsule Take 2 mg by mouth 3 times daily as needed for Diarrhea  SODIUM CHLORIDE PO Take 1 g by mouth 3 times daily      carvedilol (COREG) 3.125 MG tablet Take 3.125 mg by mouth 2 times daily (with meals)      digoxin (LANOXIN) 125 MCG tablet Take 125 mcg by mouth daily      folic acid (FOLVITE) 1 MG tablet Take 1 mg by mouth daily      Multiple Vitamin (MULTIVITAMIN ADULT PO) Take by mouth daily      Lutein 20 MG TABS Take 20 mg by mouth daily       No current facility-administered medications on file prior to visit. Glucagon and Ferrous sulfate  History reviewed. No pertinent family history. Social History     Tobacco Use   Smoking Status Former Smoker    Packs/day: 1.00    Types: Cigarettes    Quit date: 2000    Years since quittin.1   Smokeless Tobacco Never Used       Social History     Substance and Sexual Activity   Alcohol Use Yes    Comment: 2 mixed drinks a week       Review of Systems   Constitutional: Negative for appetite change, chills and fever. Eyes: Negative for redness and visual disturbance. Respiratory: Negative for cough, shortness of breath and wheezing. Cardiovascular: Negative for chest pain and leg swelling. Gastrointestinal: Positive for diarrhea. Negative for abdominal pain, constipation, nausea and vomiting. Genitourinary: Negative for decreased urine volume, difficulty urinating, discharge, dysuria, enuresis, flank pain, frequency, hematuria, penile pain, scrotal swelling, testicular pain and urgency. Musculoskeletal: Negative for back pain, joint swelling and myalgias. Skin: Negative for color change, rash and wound. Neurological: Negative for dizziness, tremors and numbness. Hematological: Negative for adenopathy. Does not bruise/bleed easily.        /69 (Site: Right Upper Arm, Position: Sitting, Cuff Size: Medium Adult)   Pulse 77   Temp 97.5 °F (36.4 °C) (Infrared)   Ht 5' 8\" (1.727 m)   Wt 168 lb (76.2 kg)   BMI 25.54 kg/m²       PHYSICAL EXAM:  Constitutional: Patient in no acute distress; Neuro: alert and oriented to person place and time. Psych: Mood and affect normal.  Lungs: Respiratory effort normal  Abdomen: Soft, non-tender, non-distended  Rectal: Deferred      Lab Results   Component Value Date    BUN 9 08/16/2021     Lab Results   Component Value Date    CREATININE 0.88 08/16/2021     No results found for: PSA    ASSESSMENT:   Diagnosis Orders   1. Urinary retention     2. Benign prostatic hyperplasia with urinary retention  MN MEASUREMENT,POST-VOID RESIDUAL VOLUME BY US,NON-IMAGING       PLAN:  Follow-up as scheduled in 2-week with PVR    Continue Flomax twice a day    Advised patient to see PCP in regards to his diarrhea.

## 2021-09-23 ENCOUNTER — OFFICE VISIT (OUTPATIENT)
Dept: UROLOGY | Age: 71
End: 2021-09-23
Payer: MEDICARE

## 2021-09-23 ENCOUNTER — HOSPITAL ENCOUNTER (OUTPATIENT)
Age: 71
Setting detail: SPECIMEN
Discharge: HOME | End: 2021-09-23
Payer: MEDICARE

## 2021-09-23 VITALS — BODY MASS INDEX: 25.54 KG/M2 | WEIGHT: 168 LBS | DIASTOLIC BLOOD PRESSURE: 68 MMHG | SYSTOLIC BLOOD PRESSURE: 98 MMHG

## 2021-09-23 DIAGNOSIS — N40.1 BPH WITH OBSTRUCTION/LOWER URINARY TRACT SYMPTOMS: ICD-10-CM

## 2021-09-23 DIAGNOSIS — R33.9 URINARY RETENTION: ICD-10-CM

## 2021-09-23 DIAGNOSIS — R33.9 INCOMPLETE BLADDER EMPTYING: ICD-10-CM

## 2021-09-23 DIAGNOSIS — N13.8 BPH WITH OBSTRUCTION/LOWER URINARY TRACT SYMPTOMS: ICD-10-CM

## 2021-09-23 DIAGNOSIS — R33.9 INCOMPLETE BLADDER EMPTYING: Primary | ICD-10-CM

## 2021-09-23 LAB
-: ABNORMAL
AMORPHOUS: ABNORMAL
BACTERIA: ABNORMAL
BILIRUBIN URINE: NEGATIVE
CASTS UA: ABNORMAL /LPF
COLOR: YELLOW
COMMENT UA: ABNORMAL
CRYSTALS, UA: ABNORMAL /HPF
EPITHELIAL CELLS UA: ABNORMAL /HPF (ref 0–5)
GLUCOSE URINE: NEGATIVE
KETONES, URINE: NEGATIVE
LEUKOCYTE ESTERASE, URINE: NEGATIVE
MUCUS: ABNORMAL
NITRITE, URINE: NEGATIVE
OTHER OBSERVATIONS UA: ABNORMAL
PH UA: 5.5 (ref 5–9)
PROTEIN UA: NEGATIVE
RBC UA: ABNORMAL /HPF (ref 0–2)
RENAL EPITHELIAL, UA: ABNORMAL /HPF
SPECIFIC GRAVITY UA: 1.01 (ref 1.01–1.02)
TRICHOMONAS: ABNORMAL
TURBIDITY: CLEAR
URINE HGB: NEGATIVE
UROBILINOGEN, URINE: NORMAL
WBC UA: ABNORMAL /HPF (ref 0–5)
YEAST: ABNORMAL

## 2021-09-23 PROCEDURE — 51798 US URINE CAPACITY MEASURE: CPT | Performed by: NURSE PRACTITIONER

## 2021-09-23 PROCEDURE — 87086 URINE CULTURE/COLONY COUNT: CPT

## 2021-09-23 PROCEDURE — 81001 URINALYSIS AUTO W/SCOPE: CPT

## 2021-09-23 PROCEDURE — 99214 OFFICE O/P EST MOD 30 MIN: CPT | Performed by: NURSE PRACTITIONER

## 2021-09-23 RX ORDER — TAMSULOSIN HYDROCHLORIDE 0.4 MG/1
0.4 CAPSULE ORAL 2 TIMES DAILY
Qty: 60 CAPSULE | Refills: 3 | Status: SHIPPED | OUTPATIENT
Start: 2021-09-23 | End: 2021-12-09 | Stop reason: SDUPTHER

## 2021-09-23 ASSESSMENT — ENCOUNTER SYMPTOMS
CONSTIPATION: 0
VOMITING: 0
NAUSEA: 0
SHORTNESS OF BREATH: 0
WHEEZING: 0
ABDOMINAL PAIN: 0
COUGH: 0
EYE REDNESS: 0
BACK PAIN: 0
COLOR CHANGE: 0

## 2021-09-23 NOTE — PROGRESS NOTES
HPI:          Patient is a 66 y.o. male in no acute distress. He is alert and oriented to person, place, and time. History  7/2021 developed postoperative retention following a AAA repair with an endovascular graft and open exploration of endovascular AAA repair in 2 to continuous endoleak. Started on Flomax    8/2021 referral from [facility] for urinary retention. Rehab facility attempted to remove the Hurst catheter, but patient was unable to urinate   Increased Flomax to twice per day    Today  Here today to follow-up for urinary retention. We did remove his Hurst catheter 2 weeks ago. He is taking Flomax twice per day. He states his bowels are moving daily. He has nocturia x2. He urinates every 2 hours during the day. He denies incontinence or gross hematuria. His PVR is 238 mL.     Past Medical History:   Diagnosis Date    Abdominal aortic aneurysm (AAA) without rupture (HCC)     Anemia, unspecified     Aortoiliac occlusive disease (HCC)     Atrial fibrillation (HCC)     Chronic ischemic heart disease, unspecified     Essential hypertension     Generalized muscle weakness     Graves disease     Hx of thyroid irradiation     Hypo-osmolality and hyponatremia     Hypothyroidism, unspecified     Other acute postprocedural pain     Other hyperlipidemia     Primary open angle glaucoma (POAG) of right eye, mild stage     Primary open angle glaucoma of right eye, mild stage     Urinary retention      Past Surgical History:   Procedure Laterality Date    ABDOMINAL AORTIC ANEURYSM REPAIR, ENDOVASCULAR  06/2020    APPENDECTOMY  2017    CAROTID ENDARTERECTOMY Right 2017    CORONARY ANGIOPLASTY WITH STENT PLACEMENT  01/2021    CC/TEDDY placed    EYE SURGERY Right 10/05/2020    Avastin (Bevacizumab) 1.25mg intravitreal injection OD (right eye)    EYE SURGERY Right 11/09/2020    Panretinal photocoagulation (PRP) OD (right eye)    HERNIA REPAIR      PACEMAKER PLACEMENT  2020    PPM and AICD     Outpatient Encounter Medications as of 9/23/2021   Medication Sig Dispense Refill    tamsulosin (FLOMAX) 0.4 MG capsule Take 1 capsule by mouth 2 times daily 60 capsule 3    mupirocin (BACTROBAN) 2 % ointment Apply topically 3 times daily      calcium gluconate 500 MG tablet Take 500 mg by mouth daily      ferrous sulfate (FE TABS 325) 325 (65 Fe) MG EC tablet Take 325 mg by mouth daily (with breakfast)      polyethylene glycol (MIRALAX) 17 g PACK packet Take 17 g by mouth daily       ELIQUIS 5 MG TABS tablet Take 5 mg by mouth 2 times daily      brimonidine (ALPHAGAN) 0.2 % ophthalmic solution 3 times daily 1 drop in right eye three times daily      dorzolamide-timolol (COSOPT) 22.3-6.8 MG/ML ophthalmic solution 1 drop in right eye twice daily      REPATHA SURECLICK 806 MG/ML SOAJ Inject 140 mg into the skin every 14 days      latanoprost (XALATAN) 0.005 % ophthalmic solution 1 drop right eye at bedtime      SYNTHROID 150 MCG tablet Take 150 mcg by mouth daily      lisinopril (PRINIVIL;ZESTRIL) 10 MG tablet Take 10 mg by mouth daily      acetaminophen (TYLENOL) 325 MG tablet Take 650 mg by mouth every 4 hours as needed for Pain      nitroGLYCERIN (NITROSTAT) 0.4 MG SL tablet Place 0.4 mg under the tongue every 5 minutes as needed for Chest pain up to max of 3 total doses. If no relief after 1 dose, call 911.        Coenzyme Q10-Vitamin E (COQ10-VITAMIN E) 100-10 MG-UNIT CAPS Take 400 mg by mouth daily      Lactobacillus (PROBIOTIC ACIDOPHILUS PO) Take by mouth daily 15 Billion cell cap daily      Turmeric 400 MG CAPS Take 400 mg by mouth daily      ASPIRIN PO Take 81 mg by mouth daily      loperamide (IMODIUM) 2 MG capsule Take 2 mg by mouth 3 times daily as needed for Diarrhea      SODIUM CHLORIDE PO Take 1 g by mouth 3 times daily      carvedilol (COREG) 3.125 MG tablet Take 3.125 mg by mouth 2 times daily (with meals)      digoxin (LANOXIN) 125 MCG tablet Take 125 mcg by mouth daily      folic acid (FOLVITE) 1 MG tablet Take 1 mg by mouth daily      Multiple Vitamin (MULTIVITAMIN ADULT PO) Take by mouth daily      Lutein 20 MG TABS Take 20 mg by mouth daily      [DISCONTINUED] tamsulosin (FLOMAX) 0.4 MG capsule Take 1 capsule by mouth 2 times daily 60 capsule 3    [DISCONTINUED] loratadine (CLARITIN) 10 MG tablet Take 10 mg by mouth daily       No facility-administered encounter medications on file as of 9/23/2021. Current Outpatient Medications on File Prior to Visit   Medication Sig Dispense Refill    mupirocin (BACTROBAN) 2 % ointment Apply topically 3 times daily      calcium gluconate 500 MG tablet Take 500 mg by mouth daily      ferrous sulfate (FE TABS 325) 325 (65 Fe) MG EC tablet Take 325 mg by mouth daily (with breakfast)      polyethylene glycol (MIRALAX) 17 g PACK packet Take 17 g by mouth daily       ELIQUIS 5 MG TABS tablet Take 5 mg by mouth 2 times daily      brimonidine (ALPHAGAN) 0.2 % ophthalmic solution 3 times daily 1 drop in right eye three times daily      dorzolamide-timolol (COSOPT) 22.3-6.8 MG/ML ophthalmic solution 1 drop in right eye twice daily      REPATHA SURECLICK 214 MG/ML SOAJ Inject 140 mg into the skin every 14 days      latanoprost (XALATAN) 0.005 % ophthalmic solution 1 drop right eye at bedtime      SYNTHROID 150 MCG tablet Take 150 mcg by mouth daily      lisinopril (PRINIVIL;ZESTRIL) 10 MG tablet Take 10 mg by mouth daily      acetaminophen (TYLENOL) 325 MG tablet Take 650 mg by mouth every 4 hours as needed for Pain      nitroGLYCERIN (NITROSTAT) 0.4 MG SL tablet Place 0.4 mg under the tongue every 5 minutes as needed for Chest pain up to max of 3 total doses. If no relief after 1 dose, call 911.        Coenzyme Q10-Vitamin E (COQ10-VITAMIN E) 100-10 MG-UNIT CAPS Take 400 mg by mouth daily      Lactobacillus (PROBIOTIC ACIDOPHILUS PO) Take by mouth daily 15 Billion cell cap daily      Turmeric 400 MG CAPS Take 400 mg by mouth daily      ASPIRIN PO Take 81 mg by mouth daily      loperamide (IMODIUM) 2 MG capsule Take 2 mg by mouth 3 times daily as needed for Diarrhea      SODIUM CHLORIDE PO Take 1 g by mouth 3 times daily      carvedilol (COREG) 3.125 MG tablet Take 3.125 mg by mouth 2 times daily (with meals)      digoxin (LANOXIN) 125 MCG tablet Take 125 mcg by mouth daily      folic acid (FOLVITE) 1 MG tablet Take 1 mg by mouth daily      Multiple Vitamin (MULTIVITAMIN ADULT PO) Take by mouth daily      Lutein 20 MG TABS Take 20 mg by mouth daily       No current facility-administered medications on file prior to visit. Glucagon and Ferrous sulfate  History reviewed. No pertinent family history. Social History     Tobacco Use   Smoking Status Former Smoker    Packs/day: 1.00    Types: Cigarettes    Quit date: 2000    Years since quittin.1   Smokeless Tobacco Never Used       Social History     Substance and Sexual Activity   Alcohol Use Yes    Comment: 2 mixed drinks a week       Review of Systems   Constitutional: Negative for appetite change, chills and fever. Eyes: Negative for redness and visual disturbance. Respiratory: Negative for cough, shortness of breath and wheezing. Cardiovascular: Negative for chest pain and leg swelling. Gastrointestinal: Negative for abdominal pain, constipation, nausea and vomiting. Genitourinary: Negative for decreased urine volume, difficulty urinating, discharge, dysuria, enuresis, flank pain, frequency, hematuria, penile pain, scrotal swelling, testicular pain and urgency. Musculoskeletal: Negative for back pain, joint swelling and myalgias. Skin: Negative for color change, rash and wound. Neurological: Negative for dizziness, tremors and numbness. Hematological: Negative for adenopathy. Does not bruise/bleed easily.        BP 98/68 (Site: Right Upper Arm, Position: Sitting, Cuff Size: Medium Adult)   Wt 168 lb (76.2 kg)   BMI 25.54 kg/m² PHYSICAL EXAM:  Constitutional: Patient in no acute distress; Neuro: alert and oriented to person place and time. Psych: Mood and affect normal.  Skin: Normal  Lungs: Respiratory effort normal  Cardiovascular:  Normal peripheral pulses  Abdomen: Soft, non-tender, non-distended with no CVA, flank pain  Bladder non-tender and not distended. Lab Results   Component Value Date    BUN 9 08/16/2021     Lab Results   Component Value Date    CREATININE 0.88 08/16/2021     No results found for: PSA    ASSESSMENT:   Diagnosis Orders   1. Incomplete bladder emptying  UT MEASUREMENT,POST-VOID RESIDUAL VOLUME BY US,NON-IMAGING    Urinalysis with Microscopic    Culture, Urine   2. Urinary retention  UT MEASUREMENT,POST-VOID RESIDUAL VOLUME BY US,NON-IMAGING    Urinalysis with Microscopic    Culture, Urine   3.  BPH with obstruction/lower urinary tract symptoms           PLAN:  We will check a UA C&S    Continue Flomax twice per day    Due to incomplete bladder emptying we will proceed with cystoscopy

## 2021-09-24 LAB
CULTURE: NO GROWTH
Lab: NORMAL
SPECIMEN DESCRIPTION: NORMAL

## 2021-09-27 ENCOUNTER — TELEPHONE (OUTPATIENT)
Dept: UROLOGY | Age: 71
End: 2021-09-27

## 2021-09-27 NOTE — TELEPHONE ENCOUNTER
----- Message from TOAN Daley - CNP sent at 9/27/2021  9:01 AM EDT -----  Call pt - urine cx reviewed and negative for UTI & for significant microhematuria

## 2021-10-14 ENCOUNTER — PROCEDURE VISIT (OUTPATIENT)
Dept: UROLOGY | Age: 71
End: 2021-10-14
Payer: MEDICARE

## 2021-10-14 VITALS
DIASTOLIC BLOOD PRESSURE: 85 MMHG | HEIGHT: 68 IN | SYSTOLIC BLOOD PRESSURE: 155 MMHG | BODY MASS INDEX: 25.46 KG/M2 | TEMPERATURE: 97.5 F | HEART RATE: 80 BPM | WEIGHT: 168 LBS

## 2021-10-14 DIAGNOSIS — R33.9 URINARY RETENTION: ICD-10-CM

## 2021-10-14 DIAGNOSIS — N40.1 BPH WITH OBSTRUCTION/LOWER URINARY TRACT SYMPTOMS: Primary | ICD-10-CM

## 2021-10-14 DIAGNOSIS — N13.8 BPH WITH OBSTRUCTION/LOWER URINARY TRACT SYMPTOMS: Primary | ICD-10-CM

## 2021-10-14 PROCEDURE — 99213 OFFICE O/P EST LOW 20 MIN: CPT | Performed by: UROLOGY

## 2021-10-14 PROCEDURE — 52000 CYSTOURETHROSCOPY: CPT | Performed by: UROLOGY

## 2021-10-14 RX ORDER — B-COMPLEX WITH VITAMIN C
1 TABLET ORAL
COMMUNITY
End: 2022-01-26 | Stop reason: ALTCHOICE

## 2021-10-14 NOTE — PATIENT INSTRUCTIONS
SURVEY:    You may be receiving a survey from [employer] regarding your visit today. Please complete the survey to enable us to provide the highest quality of care to you and your family. If you cannot score us a very good on any question, please call the office to discuss how we could of made your experience a very good one. Thank you.

## 2021-10-14 NOTE — PROGRESS NOTES
HPI:          Patient is a 66 y.o. male in no acute distress. He is alert and oriented to person, place, and time. History  7/2021 developed postoperative retention following a AAA repair with an endovascular graft and open exploration of endovascular AAA repair in 2 to continuous endoleak. Started on Flomax     [facility] Rehab facility attempted to remove the Hurst catheter, but patient was unable to urinate              Increased Flomax to twice per day      Currently  Patient is here today for lower tract visualization. Patient does have a history of BPH. He also has a history of urinary retention and inability to empty his bladder. Patient has been on Flomax twice daily since last visit. Patient did have recent lab work done. His creatinine was 0.82. Patient's BUN was 7. All other electrolytes are normal.  Patient is voiding approximately 2 hours during the day. He is voiding several times at night. He is using a urinal.  He is happy with this. Cystoscopy Procedure Note    Pre-operative Diagnosis: LUTs    Post-operative Diagnosis: Same     Surgeon: Kaylan Lora    Assistants: None    Anesthesia : Local    Procedure Details   The risks, benefits, complications, treatment options, and expected outcomes were discussed with the patient. The patient concurred with the proposed plan, giving informed consent. Cystoscopy was performed today under local anesthesia, using sterile technique. The patient was placed in the dorsal lithotomy position, prepped with CHG, and draped in the usual sterile fashion. A 14 [place] flexible cystoscope was used to systematically inspect both the urethra and bladder in their entirety. Findings:  Anterior urethra: normal without strictures  Hyperplasia: Bilobar  Bladder: Normal mucosa, without lesions.   Ureteral orifice(s) was/were seen in the normal position and effluxing clear urine  Trabeculations No  Diverticulum No  Description: Bilobar hyperplasia with high riding bladder neck, minimal hyperplasia         Specimens: Cytology/urine culture No                 Complications:  None; patient tolerated the procedure well.            Disposition: home           Condition: stable      Past Medical History:   Diagnosis Date    Abdominal aortic aneurysm (AAA) without rupture (HCC)     Anemia, unspecified     Aortoiliac occlusive disease (HCC)     Atrial fibrillation (HCC)     Chronic ischemic heart disease, unspecified     Essential hypertension     Generalized muscle weakness     Graves disease     Hx of thyroid irradiation     Hypo-osmolality and hyponatremia     Hypothyroidism, unspecified     Other acute postprocedural pain     Other hyperlipidemia     Primary open angle glaucoma (POAG) of right eye, mild stage     Primary open angle glaucoma of right eye, mild stage     Urinary retention      Past Surgical History:   Procedure Laterality Date    ABDOMINAL AORTIC ANEURYSM REPAIR, ENDOVASCULAR  06/2020    APPENDECTOMY  2017    CAROTID ENDARTERECTOMY Right 2017    CORONARY ANGIOPLASTY WITH STENT PLACEMENT  01/2021    CC/TEDDY placed    EYE SURGERY Right 10/05/2020    Avastin (Bevacizumab) 1.25mg intravitreal injection OD (right eye)    EYE SURGERY Right 11/09/2020    Panretinal photocoagulation (PRP) OD (right eye)    HERNIA REPAIR      PACEMAKER PLACEMENT  2020    PPM and AICD     Outpatient Encounter Medications as of 10/14/2021   Medication Sig Dispense Refill    tamsulosin (FLOMAX) 0.4 MG capsule Take 1 capsule by mouth 2 times daily 60 capsule 3    mupirocin (BACTROBAN) 2 % ointment Apply topically 3 times daily      calcium gluconate 500 MG tablet Take 500 mg by mouth daily      ferrous sulfate (FE TABS 325) 325 (65 Fe) MG EC tablet Take 325 mg by mouth daily (with breakfast)      polyethylene glycol (MIRALAX) 17 g PACK packet Take 17 g by mouth daily       ELIQUIS 5 MG TABS tablet Take 5 mg by mouth 2 times daily      brimonidine (ALPHAGAN) 0.2 % ophthalmic solution 3 times daily 1 drop in right eye three times daily      dorzolamide-timolol (COSOPT) 22.3-6.8 MG/ML ophthalmic solution 1 drop in right eye twice daily      REPATHA SURECLICK 427 MG/ML SOAJ Inject 140 mg into the skin every 14 days      latanoprost (XALATAN) 0.005 % ophthalmic solution 1 drop right eye at bedtime      SYNTHROID 150 MCG tablet Take 150 mcg by mouth daily      lisinopril (PRINIVIL;ZESTRIL) 10 MG tablet Take 10 mg by mouth daily      acetaminophen (TYLENOL) 325 MG tablet Take 650 mg by mouth every 4 hours as needed for Pain      nitroGLYCERIN (NITROSTAT) 0.4 MG SL tablet Place 0.4 mg under the tongue every 5 minutes as needed for Chest pain up to max of 3 total doses. If no relief after 1 dose, call 911.  Coenzyme Q10-Vitamin E (COQ10-VITAMIN E) 100-10 MG-UNIT CAPS Take 400 mg by mouth daily      Lactobacillus (PROBIOTIC ACIDOPHILUS PO) Take by mouth daily 15 Billion cell cap daily      Turmeric 400 MG CAPS Take 400 mg by mouth daily      ASPIRIN PO Take 81 mg by mouth daily      loperamide (IMODIUM) 2 MG capsule Take 2 mg by mouth 3 times daily as needed for Diarrhea      SODIUM CHLORIDE PO Take 1 g by mouth 3 times daily      carvedilol (COREG) 3.125 MG tablet Take 3.125 mg by mouth 2 times daily (with meals)      digoxin (LANOXIN) 125 MCG tablet Take 125 mcg by mouth daily      folic acid (FOLVITE) 1 MG tablet Take 1 mg by mouth daily      Multiple Vitamin (MULTIVITAMIN ADULT PO) Take by mouth daily      Lutein 20 MG TABS Take 20 mg by mouth daily       No facility-administered encounter medications on file as of 10/14/2021.       Current Outpatient Medications on File Prior to Visit   Medication Sig Dispense Refill    tamsulosin (FLOMAX) 0.4 MG capsule Take 1 capsule by mouth 2 times daily 60 capsule 3    mupirocin (BACTROBAN) 2 % ointment Apply topically 3 times daily      calcium gluconate 500 MG tablet Take 500 mg by mouth daily      ferrous sulfate (FE TABS 325) 325 (65 Fe) MG EC tablet Take 325 mg by mouth daily (with breakfast)      polyethylene glycol (MIRALAX) 17 g PACK packet Take 17 g by mouth daily       ELIQUIS 5 MG TABS tablet Take 5 mg by mouth 2 times daily      brimonidine (ALPHAGAN) 0.2 % ophthalmic solution 3 times daily 1 drop in right eye three times daily      dorzolamide-timolol (COSOPT) 22.3-6.8 MG/ML ophthalmic solution 1 drop in right eye twice daily      REPATHA SURECLICK 309 MG/ML SOAJ Inject 140 mg into the skin every 14 days      latanoprost (XALATAN) 0.005 % ophthalmic solution 1 drop right eye at bedtime      SYNTHROID 150 MCG tablet Take 150 mcg by mouth daily      lisinopril (PRINIVIL;ZESTRIL) 10 MG tablet Take 10 mg by mouth daily      acetaminophen (TYLENOL) 325 MG tablet Take 650 mg by mouth every 4 hours as needed for Pain      nitroGLYCERIN (NITROSTAT) 0.4 MG SL tablet Place 0.4 mg under the tongue every 5 minutes as needed for Chest pain up to max of 3 total doses. If no relief after 1 dose, call 911.  Coenzyme Q10-Vitamin E (COQ10-VITAMIN E) 100-10 MG-UNIT CAPS Take 400 mg by mouth daily      Lactobacillus (PROBIOTIC ACIDOPHILUS PO) Take by mouth daily 15 Billion cell cap daily      Turmeric 400 MG CAPS Take 400 mg by mouth daily      ASPIRIN PO Take 81 mg by mouth daily      loperamide (IMODIUM) 2 MG capsule Take 2 mg by mouth 3 times daily as needed for Diarrhea      SODIUM CHLORIDE PO Take 1 g by mouth 3 times daily      carvedilol (COREG) 3.125 MG tablet Take 3.125 mg by mouth 2 times daily (with meals)      digoxin (LANOXIN) 125 MCG tablet Take 125 mcg by mouth daily      folic acid (FOLVITE) 1 MG tablet Take 1 mg by mouth daily      Multiple Vitamin (MULTIVITAMIN ADULT PO) Take by mouth daily      Lutein 20 MG TABS Take 20 mg by mouth daily       No current facility-administered medications on file prior to visit. Glucagon and Ferrous sulfate  No family history on file. Social History     Tobacco Use   Smoking Status Former Smoker    Packs/day: 1.00    Types: Cigarettes    Quit date: 2000    Years since quittin.2   Smokeless Tobacco Never Used       Social History     Substance and Sexual Activity   Alcohol Use Yes    Comment: 2 mixed drinks a week       Review of Systems    There were no vitals taken for this visit. PHYSICAL EXAM:  Constitutional: Patient in no acute distress; Neuro: alert and oriented to person place and time. Psych: Mood and affect normal.  Skin: Normal  Lungs: Respiratory effort normal  Cardiovascular:  Normal peripheral pulses  Abdomen: Soft, non-tender, non-distended with no CVA, flank pain  Bladder non-tender and not distended. Lymphatics: no palpable lymphadenopathy  Penis normal  Urethral meatus normal  Scrotal exam normal  Testicles normal bilaterally  Epididymis normal bilaterally  No evidence of inguinal hernia      Lab Results   Component Value Date    BUN 9 2021     Lab Results   Component Value Date    CREATININE 0.88 2021     No results found for: PSA    ASSESSMENT:  This is a 66 y.o. male with the following diagnoses:   Diagnosis Orders   1. BPH with obstruction/lower urinary tract symptoms  [place] CYSTOURETHROSCOPY   2. Urinary retention  [place] CYSTOURETHROSCOPY       PLAN:  I did offer patient PVP greenlight today. He did decline. He will continue on with his twice daily Flomax. We will see him back in 6 months. We will repeat his PVR when he turns. We will do a lab check when he returns if we have not seen any recent renal studies. We will order some. He will call us in the interim with difficulties.

## 2021-10-14 NOTE — PROGRESS NOTES
During cystoscopy the following was utilized on patient with no adverse affects:    45% SODIUM CHLORIDE 500 ML BAG  Lot number: [id number]  Expiration date: [address] 2%   Lot number: [id number]  Expiration date: 3-23

## 2021-11-04 ENCOUNTER — HOSPITAL ENCOUNTER (OUTPATIENT)
Age: 71
Setting detail: SPECIMEN
Discharge: HOME | End: 2021-11-04
Payer: MEDICARE

## 2021-11-04 ENCOUNTER — OFFICE VISIT (OUTPATIENT)
Dept: UROLOGY | Age: 71
End: 2021-11-04
Payer: MEDICARE

## 2021-11-04 VITALS — DIASTOLIC BLOOD PRESSURE: 70 MMHG | BODY MASS INDEX: 24.78 KG/M2 | WEIGHT: 163 LBS | SYSTOLIC BLOOD PRESSURE: 100 MMHG

## 2021-11-04 DIAGNOSIS — R33.9 URINARY RETENTION: Primary | ICD-10-CM

## 2021-11-04 DIAGNOSIS — N40.1 BPH WITH OBSTRUCTION/LOWER URINARY TRACT SYMPTOMS: ICD-10-CM

## 2021-11-04 DIAGNOSIS — R30.0 DYSURIA: ICD-10-CM

## 2021-11-04 DIAGNOSIS — R33.9 URINARY RETENTION: ICD-10-CM

## 2021-11-04 DIAGNOSIS — N13.8 BPH WITH OBSTRUCTION/LOWER URINARY TRACT SYMPTOMS: ICD-10-CM

## 2021-11-04 PROCEDURE — 51798 US URINE CAPACITY MEASURE: CPT | Performed by: NURSE PRACTITIONER

## 2021-11-04 PROCEDURE — 87077 CULTURE AEROBIC IDENTIFY: CPT

## 2021-11-04 PROCEDURE — 51702 INSERT TEMP BLADDER CATH: CPT | Performed by: NURSE PRACTITIONER

## 2021-11-04 PROCEDURE — 87186 SC STD MICRODIL/AGAR DIL: CPT

## 2021-11-04 PROCEDURE — 99214 OFFICE O/P EST MOD 30 MIN: CPT | Performed by: NURSE PRACTITIONER

## 2021-11-04 PROCEDURE — 87086 URINE CULTURE/COLONY COUNT: CPT

## 2021-11-04 RX ORDER — NITROFURANTOIN 25; 75 MG/1; MG/1
100 CAPSULE ORAL 2 TIMES DAILY
Qty: 28 CAPSULE | Refills: 0 | Status: SHIPPED
Start: 2021-11-04 | End: 2021-11-08 | Stop reason: ALTCHOICE

## 2021-11-04 RX ORDER — BRIMONIDINE TARTRATE 1.5 MG/ML
SOLUTION/ DROPS OPHTHALMIC
COMMUNITY
Start: 2021-10-19 | End: 2021-11-04 | Stop reason: SDUPTHER

## 2021-11-04 NOTE — PROGRESS NOTES
Random bladderscan performed in office today:  Pt last voided 3 hours ago, scan = 155 mL    New 16F de la fuente catheter placed into the bladder via urethra under sterile technique without difficulty, with 10 mL sterile water instilled into balloon. Return of 150 mL urine. Pt tolerated catheter insertion well. Pt was instructed on catheter care and sent home with printed information. Pt advised to call office if develops pain or fever, leaking around catheter, if catheter stops draining, or for any concerns.     CATHETER:  Lot number [id number]    LIDOCAINE JELLY 2%  Lot number [id number]  Exp date 3-23    NORMAL SALINE  Lot number [id number]  Exp date 1-1-2023

## 2021-11-04 NOTE — PROGRESS NOTES
HPI:          Patient is a 66 y.o. male in no acute distress. He is alert and oriented to person, place, and time. History  7/2021 developed postoperative retention following a AAA repair with an endovascular graft and open exploration of endovascular AAA repair in 2 to continuous endoleak. Started on Flomax    [facility] Rehab facility attempted to remove the Hurst catheter, but patient was unable to urinate   Increased Flomax to twice per day    10/2021 cystoscopy showed bilobar hyperplasia with high riding bladder neck, minimal hyperplasia   PVP greenlight offered, but patient declined    Today  Here today with complaints of decreased urine flow, only dribbling to urinate, and dysuria. His symptoms started on Monday. He is taking Flomax twice per day. He states his bowels are moving daily. He has nocturia x2. He urinated 3 hours ago, but only has 155 mL in his bladder. He denies fevers, nausea or vomiting. He denies gross hematuria. He did have a cystoscopy on 10/14/2021. This did show bilobar hyperplasia with high riding bladder neck.     Past Medical History:   Diagnosis Date    Abdominal aortic aneurysm (AAA) without rupture (HCC)     Anemia, unspecified     Aortoiliac occlusive disease (HCC)     Atrial fibrillation (HCC)     Chronic ischemic heart disease, unspecified     Essential hypertension     Generalized muscle weakness     Graves disease     Hx of thyroid irradiation     Hypo-osmolality and hyponatremia     Hypothyroidism, unspecified     Other acute postprocedural pain     Other hyperlipidemia     Primary open angle glaucoma (POAG) of right eye, mild stage     Primary open angle glaucoma of right eye, mild stage     Urinary retention      Past Surgical History:   Procedure Laterality Date    ABDOMINAL AORTIC ANEURYSM REPAIR, ENDOVASCULAR  06/2020    APPENDECTOMY  2017    CAROTID ENDARTERECTOMY Right 2017    CORONARY ANGIOPLASTY WITH STENT PLACEMENT  01/2021    CC/TEDDY placed    EYE SURGERY Right 10/05/2020    Avastin (Bevacizumab) 1.25mg intravitreal injection OD (right eye)    EYE SURGERY Right 11/09/2020    Panretinal photocoagulation (PRP) OD (right eye)    HERNIA REPAIR      PACEMAKER PLACEMENT  2020    PPM and AICD     Outpatient Encounter Medications as of 11/4/2021   Medication Sig Dispense Refill    [DISCONTINUED] nitrofurantoin, macrocrystal-monohydrate, (MACROBID) 100 MG capsule Take 1 capsule by mouth 2 times daily for 14 days 28 capsule 0    Calcium Carbonate-Vitamin D (OYSTER SHELL CALCIUM/D) 500-200 MG-UNIT TABS Take 1 tablet by mouth 3 times daily (with meals)      tamsulosin (FLOMAX) 0.4 MG capsule Take 1 capsule by mouth 2 times daily 60 capsule 3    mupirocin (BACTROBAN) 2 % ointment Apply topically 3 times daily      calcium gluconate 500 MG tablet Take 500 mg by mouth daily      ferrous sulfate (FE TABS 325) 325 (65 Fe) MG EC tablet Take 325 mg by mouth daily (with breakfast)      polyethylene glycol (MIRALAX) 17 g PACK packet Take 17 g by mouth daily       ELIQUIS 5 MG TABS tablet Take 5 mg by mouth 2 times daily      brimonidine (ALPHAGAN) 0.2 % ophthalmic solution 3 times daily 1 drop in right eye three times daily      dorzolamide-timolol (COSOPT) 22.3-6.8 MG/ML ophthalmic solution 1 drop in right eye twice daily      REPATHA SURECLICK 041 MG/ML SOAJ Inject 140 mg into the skin every 14 days      latanoprost (XALATAN) 0.005 % ophthalmic solution 1 drop right eye at bedtime      SYNTHROID 150 MCG tablet Take 150 mcg by mouth daily      lisinopril (PRINIVIL;ZESTRIL) 10 MG tablet Take 10 mg by mouth daily      acetaminophen (TYLENOL) 325 MG tablet Take 650 mg by mouth every 4 hours as needed for Pain      nitroGLYCERIN (NITROSTAT) 0.4 MG SL tablet Place 0.4 mg under the tongue every 5 minutes as needed for Chest pain up to max of 3 total doses.  If no relief after 1 dose, call 911.       Coenzyme Q10-Vitamin E (COQ10-VITAMIN E) 100-10 MG-UNIT CAPS Take 400 mg by mouth daily      Lactobacillus (PROBIOTIC ACIDOPHILUS PO) Take by mouth daily 15 Billion cell cap daily      Turmeric 400 MG CAPS Take 400 mg by mouth daily      ASPIRIN PO Take 81 mg by mouth daily      loperamide (IMODIUM) 2 MG capsule Take 2 mg by mouth 3 times daily as needed for Diarrhea      SODIUM CHLORIDE PO Take 1 g by mouth 3 times daily      carvedilol (COREG) 3.125 MG tablet Take 3.125 mg by mouth 2 times daily (with meals)      folic acid (FOLVITE) 1 MG tablet Take 1 mg by mouth daily      Multiple Vitamin (MULTIVITAMIN ADULT PO) Take by mouth daily      Lutein 20 MG TABS Take 20 mg by mouth daily      [DISCONTINUED] ALPHAGAN P 0.15 % ophthalmic solution       [DISCONTINUED] digoxin (LANOXIN) 125 MCG tablet Take 125 mcg by mouth daily (Patient not taking: Reported on 10/14/2021)       No facility-administered encounter medications on file as of 11/4/2021.       Current Outpatient Medications on File Prior to Visit   Medication Sig Dispense Refill    Calcium Carbonate-Vitamin D (OYSTER SHELL CALCIUM/D) 500-200 MG-UNIT TABS Take 1 tablet by mouth 3 times daily (with meals)      tamsulosin (FLOMAX) 0.4 MG capsule Take 1 capsule by mouth 2 times daily 60 capsule 3    mupirocin (BACTROBAN) 2 % ointment Apply topically 3 times daily      calcium gluconate 500 MG tablet Take 500 mg by mouth daily      ferrous sulfate (FE TABS 325) 325 (65 Fe) MG EC tablet Take 325 mg by mouth daily (with breakfast)      polyethylene glycol (MIRALAX) 17 g PACK packet Take 17 g by mouth daily       ELIQUIS 5 MG TABS tablet Take 5 mg by mouth 2 times daily      brimonidine (ALPHAGAN) 0.2 % ophthalmic solution 3 times daily 1 drop in right eye three times daily      dorzolamide-timolol (COSOPT) 22.3-6.8 MG/ML ophthalmic solution 1 drop in right eye twice daily      REPATHA SURECLICK 501 MG/ML SOAJ Inject 140 mg into the skin every 14 days      latanoprost (XALATAN) 0.005 % ophthalmic solution 1 drop right eye at bedtime      SYNTHROID 150 MCG tablet Take 150 mcg by mouth daily      lisinopril (PRINIVIL;ZESTRIL) 10 MG tablet Take 10 mg by mouth daily      acetaminophen (TYLENOL) 325 MG tablet Take 650 mg by mouth every 4 hours as needed for Pain      nitroGLYCERIN (NITROSTAT) 0.4 MG SL tablet Place 0.4 mg under the tongue every 5 minutes as needed for Chest pain up to max of 3 total doses. If no relief after 1 dose, call 911.  Coenzyme Q10-Vitamin E (COQ10-VITAMIN E) 100-10 MG-UNIT CAPS Take 400 mg by mouth daily      Lactobacillus (PROBIOTIC ACIDOPHILUS PO) Take by mouth daily 15 Billion cell cap daily      Turmeric 400 MG CAPS Take 400 mg by mouth daily      ASPIRIN PO Take 81 mg by mouth daily      loperamide (IMODIUM) 2 MG capsule Take 2 mg by mouth 3 times daily as needed for Diarrhea      SODIUM CHLORIDE PO Take 1 g by mouth 3 times daily      carvedilol (COREG) 3.125 MG tablet Take 3.125 mg by mouth 2 times daily (with meals)      folic acid (FOLVITE) 1 MG tablet Take 1 mg by mouth daily      Multiple Vitamin (MULTIVITAMIN ADULT PO) Take by mouth daily      Lutein 20 MG TABS Take 20 mg by mouth daily       No current facility-administered medications on file prior to visit. Glucagon and Ferrous sulfate  History reviewed. No pertinent family history. Social History     Tobacco Use   Smoking Status Former Smoker    Packs/day: 1.00    Types: Cigarettes    Quit date: 2000    Years since quittin.3   Smokeless Tobacco Never Used       Social History     Substance and Sexual Activity   Alcohol Use Yes    Comment: 2 mixed drinks a week       Review of Systems   Constitutional: Negative for appetite change, chills and fever. Eyes: Negative for redness and visual disturbance. Respiratory: Negative for cough, shortness of breath and wheezing.     Cardiovascular: Negative for chest pain and leg swelling. Gastrointestinal: Negative for abdominal pain, constipation, nausea and vomiting. Genitourinary: Positive for decreased urine volume, difficulty urinating and dysuria. Negative for enuresis, flank pain, frequency, hematuria, penile discharge, penile pain, scrotal swelling, testicular pain and urgency. Musculoskeletal: Negative for back pain, joint swelling and myalgias. Skin: Negative for color change, rash and wound. Neurological: Negative for dizziness, tremors and numbness. Hematological: Negative for adenopathy. Does not bruise/bleed easily. /70 (Site: Right Upper Arm, Position: Sitting, Cuff Size: Medium Adult)   Wt 163 lb (73.9 kg)   BMI 24.78 kg/m²       PHYSICAL EXAM:  Constitutional: Patient in no acute distress; Neuro: alert and oriented to person place and time. Psych: Mood and affect normal.  Skin: Normal  Lungs: Respiratory effort normal  Cardiovascular:  Normal peripheral pulses  Abdomen: Soft, non-tender, non-distended with no CVA, flank pain  Bladder non-tender and not distended. Lymphatics: no palpable lymphadenopathy  Penis normal      Lab Results   Component Value Date    BUN 9 08/16/2021     Lab Results   Component Value Date    CREATININE 0.88 08/16/2021     No results found for: PSA    ASSESSMENT:   Diagnosis Orders   1. Urinary retention  UT MEASUREMENT,POST-VOID RESIDUAL VOLUME BY US,NON-IMAGING    Culture, Urine    UT INSERT,TEMP INDWELLING BLAD CATH,SIMPLE   2. BPH with obstruction/lower urinary tract symptoms  UT MEASUREMENT,POST-VOID RESIDUAL VOLUME BY US,NON-IMAGING    Culture, Urine    UT INSERT,TEMP INDWELLING BLAD CATH,SIMPLE   3. Dysuria  UT MEASUREMENT,POST-VOID RESIDUAL VOLUME BY US,NON-IMAGING    Culture, Urine    UT INSERT,TEMP INDWELLING BLAD CATH,SIMPLE         PLAN:  We will check a UA C&S. We will start empiric Macrobid. This was culture specific according to his prior culture.     I gave the patient the option of learning how to perform intermittent catheterization, placing a Hurst catheter, or monitoring his symptoms. He did opt to place a Hurst catheter. However, he is concerned because his wife is being inducted into the volleyball [employer] [address] next weekend and he would like to be present for that without a catheter. Pending the culture results we will try to remove the catheter early next week. We discussed proceeding with PVP greenlight. We will determine this at follow-up.

## 2021-11-06 LAB
CULTURE: ABNORMAL
Lab: ABNORMAL
SPECIMEN DESCRIPTION: ABNORMAL

## 2021-11-08 ENCOUNTER — TELEPHONE (OUTPATIENT)
Dept: UROLOGY | Age: 71
End: 2021-11-08

## 2021-11-08 RX ORDER — CIPROFLOXACIN 500 MG/1
500 TABLET, FILM COATED ORAL 2 TIMES DAILY
Qty: 20 TABLET | Refills: 0 | Status: SHIPPED | OUTPATIENT
Start: 2021-11-08 | End: 2021-11-18

## 2021-11-08 ASSESSMENT — ENCOUNTER SYMPTOMS
BACK PAIN: 0
SHORTNESS OF BREATH: 0
COUGH: 0
NAUSEA: 0
EYE REDNESS: 0
COLOR CHANGE: 0
WHEEZING: 0
VOMITING: 0
ABDOMINAL PAIN: 0
CONSTIPATION: 0

## 2021-11-08 NOTE — TELEPHONE ENCOUNTER
Urine culture is positive for infection. STOP macrobid. Start cipro. Take this antibiotic until gone. Take this with food and eat yogurt once per day to prevent GI upset. If you develop nausea, vomiting, or fevers call the office or go to the ER. If your urinary symptoms do not improve once completing the antibiotics call our office. Plan to remove catheter Wednesday morning early.

## 2021-11-08 NOTE — TELEPHONE ENCOUNTER
Patient made aware of positive urine culture results and to start antibiotics.  Patient also scheduled for catheter removal.

## 2021-11-10 ENCOUNTER — TELEPHONE (OUTPATIENT)
Dept: UROLOGY | Age: 71
End: 2021-11-10

## 2021-11-10 ENCOUNTER — OFFICE VISIT (OUTPATIENT)
Dept: UROLOGY | Age: 71
End: 2021-11-10
Payer: MEDICARE

## 2021-11-10 VITALS
SYSTOLIC BLOOD PRESSURE: 117 MMHG | OXYGEN SATURATION: 97 % | BODY MASS INDEX: 25.01 KG/M2 | DIASTOLIC BLOOD PRESSURE: 76 MMHG | WEIGHT: 165 LBS | HEIGHT: 68 IN | HEART RATE: 72 BPM | TEMPERATURE: 96.9 F

## 2021-11-10 DIAGNOSIS — N13.8 BPH WITH OBSTRUCTION/LOWER URINARY TRACT SYMPTOMS: Primary | ICD-10-CM

## 2021-11-10 DIAGNOSIS — N40.1 BPH WITH OBSTRUCTION/LOWER URINARY TRACT SYMPTOMS: Primary | ICD-10-CM

## 2021-11-10 DIAGNOSIS — R33.9 URINARY RETENTION: ICD-10-CM

## 2021-11-10 DIAGNOSIS — K59.09 OTHER CONSTIPATION: ICD-10-CM

## 2021-11-10 PROCEDURE — 99214 OFFICE O/P EST MOD 30 MIN: CPT | Performed by: PHYSICIAN ASSISTANT

## 2021-11-10 ASSESSMENT — ENCOUNTER SYMPTOMS
EYE REDNESS: 0
COUGH: 0
CONSTIPATION: 1
DIARRHEA: 1
SHORTNESS OF BREATH: 0
NAUSEA: 0
ABDOMINAL PAIN: 0
WHEEZING: 0
COLOR CHANGE: 0
VOMITING: 0
BACK PAIN: 0

## 2021-11-10 NOTE — PROGRESS NOTES
Catheter balloon was deflated and catheter was removed from the urethra with no adverse reactions. Patient tolerated the procedure well.

## 2021-11-10 NOTE — TELEPHONE ENCOUNTER
Patient called to report that since his catheter removal this AM, he has drank 90 oz of fluid and has voided twice (80 oz and 120oz). Last void was at 3pm. Patient denies feeling full or uncomfortable. Patient advised to call the office or go to the ER if having difficulty with urination or unable to void. Starts with fever/chills, nausea or vomiting. Patient voiced understanding.

## 2021-11-10 NOTE — PROGRESS NOTES
HPI:      Patient is a 66 y.o. male in no acute distress. He is alert and oriented to person, place, and time. History  7/2021 developed postoperative retention following a AAA repair with an endovascular graft and open exploration of endovascular AAA repair in 2 to continuous endoleak. Started on Flomax     8/2021 referral from [facility] for urinary retention. Rehab facility attempted to remove the Hurst catheter, but patient was unable to urinate              Increased Flomax to twice per day     10/2021 cystoscopy showed bilobar hyperplasia with high riding bladder neck, minimal hyperplasia              PVP greenlight offered, but patient declined     Today  Patient is here today for follow-up urinary retention. At last visit patient opted to have Hurst catheter placed. Patient did have an Enterobacter urinary tract infection. Patient was changed to course of oral Cipro. Patient is currently on Cipro. Patient did have a cystoscopy last month and was offered a PVP greenlight but declined. Patient is currently on Flomax twice a day. Patient is not having a daily bowel movement. Patient states that he is issues with intermittent diarrhea ever since he had aortic surgery. He states that he has an upcoming colonoscopy. Patient states that he takes Imodium. He states that approximately 10 days a month he has diarrhea. He states that approximately 10 days he has stools that he feels are normal but the stools look like \"little balls\". 5 days out of the month he does not have a bowel movement. The other 5 days he has to strain to go. Patient is adverse to having a Hurst catheter put back in or intermittent cathing.   He also does not want to have any surgeries.     Past Medical History:   Diagnosis Date    Abdominal aortic aneurysm (AAA) without rupture (HCC)     Anemia, unspecified     Aortoiliac occlusive disease (HCC)     Atrial fibrillation (HCC)     Chronic ischemic heart disease, unspecified     Essential hypertension     Generalized muscle weakness     Graves disease     Hx of thyroid irradiation     Hypo-osmolality and hyponatremia     Hypothyroidism, unspecified     Other acute postprocedural pain     Other hyperlipidemia     Primary open angle glaucoma (POAG) of right eye, mild stage     Primary open angle glaucoma of right eye, mild stage     Urinary retention      Past Surgical History:   Procedure Laterality Date    ABDOMINAL AORTIC ANEURYSM REPAIR, ENDOVASCULAR  06/2020    APPENDECTOMY  2017    CAROTID ENDARTERECTOMY Right 2017    CORONARY ANGIOPLASTY WITH STENT PLACEMENT  01/2021    CC/TEDDY placed    EYE SURGERY Right 10/05/2020    Avastin (Bevacizumab) 1.25mg intravitreal injection OD (right eye)    EYE SURGERY Right 11/09/2020    Panretinal photocoagulation (PRP) OD (right eye)    HERNIA REPAIR      PACEMAKER PLACEMENT  2020    PPM and AICD     Outpatient Encounter Medications as of 11/10/2021   Medication Sig Dispense Refill    ciprofloxacin (CIPRO) 500 MG tablet Take 1 tablet by mouth 2 times daily for 10 days 20 tablet 0    Calcium Carbonate-Vitamin D (OYSTER SHELL CALCIUM/D) 500-200 MG-UNIT TABS Take 1 tablet by mouth 3 times daily (with meals)      tamsulosin (FLOMAX) 0.4 MG capsule Take 1 capsule by mouth 2 times daily 60 capsule 3    mupirocin (BACTROBAN) 2 % ointment Apply topically 3 times daily      calcium gluconate 500 MG tablet Take 500 mg by mouth daily      ferrous sulfate (FE TABS 325) 325 (65 Fe) MG EC tablet Take 325 mg by mouth daily (with breakfast)      polyethylene glycol (MIRALAX) 17 g PACK packet Take 17 g by mouth daily       ELIQUIS 5 MG TABS tablet Take 5 mg by mouth 2 times daily      brimonidine (ALPHAGAN) 0.2 % ophthalmic solution 3 times daily 1 drop in right eye three times daily      dorzolamide-timolol (COSOPT) 22.3-6.8 MG/ML ophthalmic solution 1 drop in right eye twice daily      REPATHA SURECLICK 378 MG/ML SOAJ Inject 140 mg into the skin every 14 days      latanoprost (XALATAN) 0.005 % ophthalmic solution 1 drop right eye at bedtime      SYNTHROID 150 MCG tablet Take 150 mcg by mouth daily      lisinopril (PRINIVIL;ZESTRIL) 10 MG tablet Take 10 mg by mouth daily      acetaminophen (TYLENOL) 325 MG tablet Take 650 mg by mouth every 4 hours as needed for Pain      nitroGLYCERIN (NITROSTAT) 0.4 MG SL tablet Place 0.4 mg under the tongue every 5 minutes as needed for Chest pain up to max of 3 total doses. If no relief after 1 dose, call 911.  Coenzyme Q10-Vitamin E (COQ10-VITAMIN E) 100-10 MG-UNIT CAPS Take 400 mg by mouth daily      Lactobacillus (PROBIOTIC ACIDOPHILUS PO) Take by mouth daily 15 Billion cell cap daily      Turmeric 400 MG CAPS Take 400 mg by mouth daily      ASPIRIN PO Take 81 mg by mouth daily      loperamide (IMODIUM) 2 MG capsule Take 2 mg by mouth 3 times daily as needed for Diarrhea      SODIUM CHLORIDE PO Take 1 g by mouth 3 times daily      carvedilol (COREG) 3.125 MG tablet Take 3.125 mg by mouth 2 times daily (with meals)      folic acid (FOLVITE) 1 MG tablet Take 1 mg by mouth daily      Multiple Vitamin (MULTIVITAMIN ADULT PO) Take by mouth daily      Lutein 20 MG TABS Take 20 mg by mouth daily       No facility-administered encounter medications on file as of 11/10/2021.       Current Outpatient Medications on File Prior to Visit   Medication Sig Dispense Refill    ciprofloxacin (CIPRO) 500 MG tablet Take 1 tablet by mouth 2 times daily for 10 days 20 tablet 0    Calcium Carbonate-Vitamin D (OYSTER SHELL CALCIUM/D) 500-200 MG-UNIT TABS Take 1 tablet by mouth 3 times daily (with meals)      tamsulosin (FLOMAX) 0.4 MG capsule Take 1 capsule by mouth 2 times daily 60 capsule 3    mupirocin (BACTROBAN) 2 % ointment Apply topically 3 times daily      calcium gluconate 500 MG tablet Take 500 mg by mouth daily      ferrous sulfate (FE TABS 325) 325 (65 Fe) MG EC tablet Take 325 mg by mouth daily (with breakfast)      polyethylene glycol (MIRALAX) 17 g PACK packet Take 17 g by mouth daily       ELIQUIS 5 MG TABS tablet Take 5 mg by mouth 2 times daily      brimonidine (ALPHAGAN) 0.2 % ophthalmic solution 3 times daily 1 drop in right eye three times daily      dorzolamide-timolol (COSOPT) 22.3-6.8 MG/ML ophthalmic solution 1 drop in right eye twice daily      REPATHA SURECLICK 465 MG/ML SOAJ Inject 140 mg into the skin every 14 days      latanoprost (XALATAN) 0.005 % ophthalmic solution 1 drop right eye at bedtime      SYNTHROID 150 MCG tablet Take 150 mcg by mouth daily      lisinopril (PRINIVIL;ZESTRIL) 10 MG tablet Take 10 mg by mouth daily      acetaminophen (TYLENOL) 325 MG tablet Take 650 mg by mouth every 4 hours as needed for Pain      nitroGLYCERIN (NITROSTAT) 0.4 MG SL tablet Place 0.4 mg under the tongue every 5 minutes as needed for Chest pain up to max of 3 total doses. If no relief after 1 dose, call 911.  Coenzyme Q10-Vitamin E (COQ10-VITAMIN E) 100-10 MG-UNIT CAPS Take 400 mg by mouth daily      Lactobacillus (PROBIOTIC ACIDOPHILUS PO) Take by mouth daily 15 Billion cell cap daily      Turmeric 400 MG CAPS Take 400 mg by mouth daily      ASPIRIN PO Take 81 mg by mouth daily      loperamide (IMODIUM) 2 MG capsule Take 2 mg by mouth 3 times daily as needed for Diarrhea      SODIUM CHLORIDE PO Take 1 g by mouth 3 times daily      carvedilol (COREG) 3.125 MG tablet Take 3.125 mg by mouth 2 times daily (with meals)      folic acid (FOLVITE) 1 MG tablet Take 1 mg by mouth daily      Multiple Vitamin (MULTIVITAMIN ADULT PO) Take by mouth daily      Lutein 20 MG TABS Take 20 mg by mouth daily       No current facility-administered medications on file prior to visit. Glucagon and Ferrous sulfate  History reviewed. No pertinent family history.   Social History     Tobacco Use   Smoking Status Former Smoker    Packs/day: 1.00    Types: Cigarettes    Quit date: 2000    Years since quittin.3   Smokeless Tobacco Never Used       Social History     Substance and Sexual Activity   Alcohol Use Yes    Comment: 2 mixed drinks a week       Review of Systems   Constitutional: Negative for appetite change, chills and fever. Eyes: Negative for redness and visual disturbance. Respiratory: Negative for cough, shortness of breath and wheezing. Cardiovascular: Negative for chest pain and leg swelling. Gastrointestinal: Positive for constipation and diarrhea. Negative for abdominal pain, nausea and vomiting. Genitourinary: Positive for difficulty urinating. Negative for decreased urine volume, dysuria, enuresis, flank pain, frequency, hematuria, penile discharge, penile pain, scrotal swelling, testicular pain and urgency. Musculoskeletal: Negative for back pain, joint swelling and myalgias. Skin: Negative for color change, rash and wound. Neurological: Negative for dizziness, tremors and numbness. Hematological: Negative for adenopathy. Does not bruise/bleed easily. /76   Pulse 72   Temp 96.9 °F (36.1 °C) (Infrared)   Ht 5' 8\" (1.727 m)   Wt 165 lb (74.8 kg)   SpO2 97%   BMI 25.09 kg/m²       PHYSICAL EXAM:  Constitutional: Patient in no acute distress; Neuro: alert and oriented to person place and time. Psych: Mood and affect normal.  Lungs: Respiratory effort normal  Abdomen: Soft, non-tender, non-distended  Rectal: deferred       Lab Results   Component Value Date    BUN 9 2021     Lab Results   Component Value Date    CREATININE 0.88 2021     No results found for: PSA    ASSESSMENT:   Diagnosis Orders   1. BPH with obstruction/lower urinary tract symptoms     2. Urinary retention     3. Other constipation           PLAN:  Continue Flomax twice a day    We discussed with him at great length the importance of having good soft easy to pass bowel movement daily and its relationship to urinary symptoms.   Patient and his wife were very reluctant for him starting a daily dose of MiraLAX as he does have issues with diarrhea ever since his AAA repair. It does sound as though he is having constipation symptoms over half of the month. He does take Imodium at least 5 or 6 days out of the month. Complete course of antibiotics    · Drink plenty of fluids, aim for 80 oz daily for the next few days. · It is normal to feel a little discomfort the next few times you void. · Try to urinate every 2-3 hours while awake (even if you don't feel like you have to void). · If you DO urinate, please record the amount. · If you do NOT urinate within about 6 hours OR if you feel the strong uncomfortable urge to void but are not able - you'll need to come back to the office to get the catheter replaced. · Either way, call our office around 3:30 pm and let us know the void amounts (if you are urinating) or let us know that you're on your way back to the office (if you are not urinating). Discussed risks and benefits of PVP Greenlight to include, but not limited: risk of anesthesia, bleeding, infection, injury to the  tract, and retrograde ejaculation. This includes major risks such as: bladder perforation or injury to the rectum. We also discussed the need for post-operative de la fuente catheter. We discussed a healing period of 12 weeks. This healing period includes irritative voiding symptoms, but he understand that each case is different and may take more or less healing time. Patient does not wish to schedule procedure at this time. Follow-up in 2 weeks with [facility]    Spent 30 mins, >50% time face-to-face, discussing diagnoses, any applicable test results, treatment plan/options, and prognosis.

## 2021-11-10 NOTE — PATIENT INSTRUCTIONS
· Drink plenty of fluids, aim for 80 oz daily for the next few days. · It is normal to feel a little discomfort the next few times you void. · Try to urinate every 2-3 hours while awake (even if you don't feel like you have to void). · If you DO urinate, please record the amount. · If you do NOT urinate within about 6 hours OR if you feel the strong uncomfortable urge to void but are not able - you'll need to come back to the office to get the catheter replaced. · Either way, call our office around 3:30 pm and let us know the void amounts (if you are urinating) or let us know that you're on your way back to the office (if you are not urinating). SURVEY:    You may be receiving a survey from [employer] regarding your visit today. Please complete the survey to enable us to provide the highest quality of care to you and your family. If you cannot score us a very good on any question, please call the office to discuss how we could have made your experience a very good one. Thank you.

## 2021-12-09 RX ORDER — TAMSULOSIN HYDROCHLORIDE 0.4 MG/1
0.4 CAPSULE ORAL 2 TIMES DAILY
Qty: 180 CAPSULE | Refills: 3 | Status: SHIPPED | OUTPATIENT
Start: 2021-12-09 | End: 2022-06-07

## 2021-12-09 NOTE — TELEPHONE ENCOUNTER
Patient seen within the past year. Patient tolerates Flomax twice a day well. His chart was reviewed. We will refill medications.

## 2022-01-26 ENCOUNTER — HOSPITAL ENCOUNTER (OUTPATIENT)
Age: 72
Discharge: HOME | End: 2022-01-28
Payer: MEDICARE

## 2022-01-26 ENCOUNTER — HOSPITAL ENCOUNTER (OUTPATIENT)
Age: 72
Setting detail: SPECIMEN
Discharge: HOME | End: 2022-01-26
Payer: MEDICARE

## 2022-01-26 ENCOUNTER — HOSPITAL ENCOUNTER (OUTPATIENT)
Dept: GENERAL RADIOLOGY | Age: 72
Discharge: HOME | End: 2022-01-28
Payer: MEDICARE

## 2022-01-26 ENCOUNTER — OFFICE VISIT (OUTPATIENT)
Dept: UROLOGY | Age: 72
End: 2022-01-26
Payer: MEDICARE

## 2022-01-26 ENCOUNTER — HOSPITAL ENCOUNTER (OUTPATIENT)
Age: 72
Discharge: HOME | End: 2022-01-26
Payer: MEDICARE

## 2022-01-26 VITALS
HEART RATE: 71 BPM | WEIGHT: 169 LBS | HEIGHT: 68 IN | BODY MASS INDEX: 25.61 KG/M2 | SYSTOLIC BLOOD PRESSURE: 115 MMHG | TEMPERATURE: 97.3 F | DIASTOLIC BLOOD PRESSURE: 77 MMHG

## 2022-01-26 DIAGNOSIS — R33.9 INCOMPLETE BLADDER EMPTYING: ICD-10-CM

## 2022-01-26 DIAGNOSIS — N41.8 OTHER PROSTATIC INFLAMMATORY DISEASES: ICD-10-CM

## 2022-01-26 DIAGNOSIS — K59.09 OTHER CONSTIPATION: ICD-10-CM

## 2022-01-26 DIAGNOSIS — N13.8 BPH WITH OBSTRUCTION/LOWER URINARY TRACT SYMPTOMS: Primary | ICD-10-CM

## 2022-01-26 DIAGNOSIS — N40.1 BPH WITH OBSTRUCTION/LOWER URINARY TRACT SYMPTOMS: Primary | ICD-10-CM

## 2022-01-26 LAB
-: ABNORMAL
ABSOLUTE EOS #: 0.03 K/UL (ref 0–0.44)
ABSOLUTE IMMATURE GRANULOCYTE: 0.03 K/UL (ref 0–0.3)
ABSOLUTE LYMPH #: 0.61 K/UL (ref 1.1–3.7)
ABSOLUTE MONO #: 0.57 K/UL (ref 0.1–1.2)
ALBUMIN SERPL-MCNC: 3.5 G/DL (ref 3.5–5.2)
ALBUMIN/GLOBULIN RATIO: 1.1 (ref 1–2.5)
ALP BLD-CCNC: 152 U/L (ref 40–129)
ALT SERPL-CCNC: 9 U/L (ref 5–41)
AMORPHOUS: ABNORMAL
ANION GAP SERPL CALCULATED.3IONS-SCNC: 13 MMOL/L (ref 9–17)
AST SERPL-CCNC: 13 U/L
BACTERIA: ABNORMAL
BASOPHILS # BLD: 1 % (ref 0–2)
BASOPHILS ABSOLUTE: 0.03 K/UL (ref 0–0.2)
BILIRUB SERPL-MCNC: 1.88 MG/DL (ref 0.3–1.2)
BILIRUBIN URINE: ABNORMAL
BUN BLDV-MCNC: 13 MG/DL (ref 8–23)
BUN/CREAT BLD: 13 (ref 9–20)
CALCIUM SERPL-MCNC: 8.4 MG/DL (ref 8.6–10.4)
CASTS UA: ABNORMAL /LPF
CHLORIDE BLD-SCNC: 92 MMOL/L (ref 98–107)
CO2: 24 MMOL/L (ref 20–31)
COLOR: YELLOW
COMMENT UA: ABNORMAL
CREAT SERPL-MCNC: 0.98 MG/DL (ref 0.7–1.2)
CRYSTALS, UA: ABNORMAL /HPF
DIFFERENTIAL TYPE: ABNORMAL
EOSINOPHILS RELATIVE PERCENT: 1 % (ref 1–4)
EPITHELIAL CELLS UA: ABNORMAL /HPF (ref 0–5)
GFR AFRICAN AMERICAN: >60 ML/MIN
GFR NON-AFRICAN AMERICAN: >60 ML/MIN
GFR SERPL CREATININE-BSD FRML MDRD: ABNORMAL ML/MIN/{1.73_M2}
GFR SERPL CREATININE-BSD FRML MDRD: ABNORMAL ML/MIN/{1.73_M2}
GLUCOSE BLD-MCNC: 114 MG/DL (ref 70–99)
GLUCOSE URINE: NEGATIVE
HCT VFR BLD CALC: 41.9 % (ref 40.7–50.3)
HEMOGLOBIN: 13.5 G/DL (ref 13–17)
IMMATURE GRANULOCYTES: 1 %
KETONES, URINE: NEGATIVE
LEUKOCYTE ESTERASE, URINE: NEGATIVE
LYMPHOCYTES # BLD: 10 % (ref 24–43)
MCH RBC QN AUTO: 30.8 PG (ref 25.2–33.5)
MCHC RBC AUTO-ENTMCNC: 32.2 G/DL (ref 28.4–34.8)
MCV RBC AUTO: 95.7 FL (ref 82.6–102.9)
MONOCYTES # BLD: 9 % (ref 3–12)
MUCUS: ABNORMAL
NITRITE, URINE: NEGATIVE
NRBC AUTOMATED: 0 PER 100 WBC
OTHER OBSERVATIONS UA: ABNORMAL
PDW BLD-RTO: 17.1 % (ref 11.8–14.4)
PH UA: 5.5 (ref 5–9)
PLATELET # BLD: ABNORMAL K/UL (ref 138–453)
PLATELET ESTIMATE: ABNORMAL
PLATELET, FLUORESCENCE: 102 K/UL (ref 138–453)
PLATELET, IMMATURE FRACTION: 7.4 % (ref 1.1–10.3)
PMV BLD AUTO: ABNORMAL FL (ref 8.1–13.5)
POTASSIUM SERPL-SCNC: 4.1 MMOL/L (ref 3.7–5.3)
PROTEIN UA: NEGATIVE
RBC # BLD: 4.38 M/UL (ref 4.21–5.77)
RBC # BLD: ABNORMAL 10*6/UL
RBC UA: ABNORMAL /HPF (ref 0–2)
RENAL EPITHELIAL, UA: ABNORMAL /HPF
SEG NEUTROPHILS: 80 % (ref 36–65)
SEGMENTED NEUTROPHILS ABSOLUTE COUNT: 4.99 K/UL (ref 1.5–8.1)
SODIUM BLD-SCNC: 129 MMOL/L (ref 135–144)
SPECIFIC GRAVITY UA: 1.02 (ref 1.01–1.02)
TOTAL PROTEIN: 6.6 G/DL (ref 6.4–8.3)
TRICHOMONAS: ABNORMAL
TURBIDITY: CLEAR
URINE HGB: NEGATIVE
UROBILINOGEN, URINE: NORMAL
WBC # BLD: 6.3 K/UL (ref 3.5–11.3)
WBC # BLD: ABNORMAL 10*3/UL
WBC UA: ABNORMAL /HPF (ref 0–5)
YEAST: ABNORMAL

## 2022-01-26 PROCEDURE — 51798 US URINE CAPACITY MEASURE: CPT | Performed by: PHYSICIAN ASSISTANT

## 2022-01-26 PROCEDURE — 85025 COMPLETE CBC W/AUTO DIFF WBC: CPT

## 2022-01-26 PROCEDURE — 87086 URINE CULTURE/COLONY COUNT: CPT

## 2022-01-26 PROCEDURE — 85055 RETICULATED PLATELET ASSAY: CPT

## 2022-01-26 PROCEDURE — 99214 OFFICE O/P EST MOD 30 MIN: CPT | Performed by: PHYSICIAN ASSISTANT

## 2022-01-26 PROCEDURE — 74018 RADEX ABDOMEN 1 VIEW: CPT

## 2022-01-26 PROCEDURE — 36415 COLL VENOUS BLD VENIPUNCTURE: CPT

## 2022-01-26 PROCEDURE — 81001 URINALYSIS AUTO W/SCOPE: CPT

## 2022-01-26 PROCEDURE — 80053 COMPREHEN METABOLIC PANEL: CPT

## 2022-01-26 RX ORDER — LEVOFLOXACIN 500 MG/1
500 TABLET, FILM COATED ORAL DAILY
Qty: 10 TABLET | Refills: 0 | Status: SHIPPED | OUTPATIENT
Start: 2022-01-26 | End: 2022-02-05

## 2022-01-26 RX ORDER — CHOLESTYRAMINE 4 G/9G
1 POWDER, FOR SUSPENSION ORAL
COMMUNITY
Start: 2021-12-09

## 2022-01-26 RX ORDER — OMEPRAZOLE 40 MG/1
40 CAPSULE, DELAYED RELEASE ORAL DAILY
COMMUNITY
Start: 2021-12-09

## 2022-01-26 RX ORDER — CLOPIDOGREL BISULFATE 75 MG/1
75 TABLET ORAL DAILY
COMMUNITY

## 2022-01-26 ASSESSMENT — ENCOUNTER SYMPTOMS
COLOR CHANGE: 0
ABDOMINAL PAIN: 0
NAUSEA: 0
BACK PAIN: 0
WHEEZING: 0
EYE REDNESS: 0
VOMITING: 0
COUGH: 0
SHORTNESS OF BREATH: 0
CONSTIPATION: 0

## 2022-01-26 NOTE — PATIENT INSTRUCTIONS
BLADDER IRRITANTS     There are several changes you can make to your diet to help improve your urinary symptoms. The following have been shown to cause irritation to the bladder and should be AVOIDED if possible:  ~ Coffee (including decaffeinated)   ~ ALL Tea (including green teas and decaffeinated)  ~ Soda/Pop/carbonated beverages/Energy drinks (especially dark dyed gay, root beers, mountain dew, etc)  ~These are MUCH worse if they have caffeine, but can also be irritative to the bladder even without caffeine  ~ Alcoholic beverages  ~ Spicy foods (peppers contain capsaicin, which is very irritating to the bladder)  ~ Acidic foods (for example: tomato based foods, orange juice, etc)    We encourage increased water intake, unless you have been placed on a fluid restriction by another provider. SURVEY:    You may be receiving a survey from [employer] regarding your visit today. Please complete the survey to enable us to provide the highest quality of care to you and your family. If you cannot score us a very good on any question, please call the office to discuss how we could have made your experience a very good one. Thank you.

## 2022-01-26 NOTE — PROGRESS NOTES
HPI:      Patient is a 66 y.o. male in no acute distress. He is alert and oriented to person, place, and time. History  7/2021 developed postoperative retention following a AAA repair with an endovascular graft and open exploration of endovascular AAA repair in 2 to continuous endoleak. Started on Flomax     [facility]. Rehab facility attempted to remove the Hurst catheter, but patient was unable to urinate              Increased Flomax to twice per day     10/2021 cystoscopy showed bilobar hyperplasia with high riding bladder neck, minimal hyperplasia              PVP greenlight offered, but patient declined     Today  Patient is here today as he is having difficulty urinating. Patient previously seen for urinary retention. Patient states that he voided approximately 10 hours ago and has not been really able to void since. Patient is taking Flomax twice a day. Patient states that he had massive diarrhea this morning. Patient has stopped taking MiraLAX several weeks ago. Patient states that he was having issues with diarrhea off and on. Patient states that he saw GI and was started on Questran. Patient currently takes 1 packet of Questran a day. .  Patient was previously seen by our office for urinary retention. Prior to this morning he was urinating to 3 times a night. Patient did have a cystoscopy less than 6 months ago, at that time we did offer him a PVP greenlight but he declined. Random bladderscan performed in office today: Patient last voided 2 AM ago, scan =  243 mL.      Past Medical History:   Diagnosis Date    Abdominal aortic aneurysm (AAA) without rupture (HCC)     Anemia, unspecified     Aortoiliac occlusive disease (HCC)     Atrial fibrillation (HCC)     Chronic ischemic heart disease, unspecified     Essential hypertension     Generalized muscle weakness     Graves disease     Hx of thyroid irradiation     Hypo-osmolality and hyponatremia     Hypothyroidism, unspecified     Other acute postprocedural pain     Other hyperlipidemia     Primary open angle glaucoma (POAG) of right eye, mild stage     Primary open angle glaucoma of right eye, mild stage     Urinary retention      Past Surgical History:   Procedure Laterality Date    ABDOMINAL AORTIC ANEURYSM REPAIR, ENDOVASCULAR  06/2020    APPENDECTOMY  2017    CAROTID ENDARTERECTOMY Right 2017    COLONOSCOPY      Dr. Emile Goff  01/2021    CC/TEDDY placed    EYE SURGERY Right 10/05/2020    Avastin (Bevacizumab) 1.25mg intravitreal injection OD (right eye)    EYE SURGERY Right 11/09/2020    Panretinal photocoagulation (PRP) OD (right eye)   [facility]  2020    [facility]   [address]      Dr. Won Painting 9     Outpatient Encounter Medications as of 1/26/2022   Medication Sig Dispense Refill    cholestyramine (QUESTRAN) 4 g packet Take 1 packet by mouth 3 times daily (with meals)      omeprazole (PRILOSEC) 40 MG delayed release capsule Take 40 mg by mouth daily      clopidogrel (PLAVIX) 75 MG tablet Take 75 mg by mouth daily      levoFLOXacin (LEVAQUIN) 500 MG tablet Take 1 tablet by mouth daily for 10 days 10 tablet 0    tamsulosin (FLOMAX) 0.4 MG capsule Take 1 capsule by mouth 2 times daily 180 capsule 3    mupirocin (BACTROBAN) 2 % ointment Apply topically 3 times daily      calcium gluconate 500 MG tablet Take 500 mg by mouth daily      ferrous sulfate (FE TABS 325) 325 (65 Fe) MG EC tablet Take 325 mg by mouth daily (with breakfast)      ELIQUIS 5 MG TABS tablet Take 5 mg by mouth 2 times daily      brimonidine (ALPHAGAN) 0.2 % ophthalmic solution 3 times daily 1 drop in right eye three times daily      dorzolamide-timolol (COSOPT) 22.3-6.8 MG/ML ophthalmic solution 1 drop in right eye twice daily      REPATHA SURECLICK 910 MG/ML SOAJ Inject 140 mg into the skin every 14 days      latanoprost (XALATAN) 0.005 % ophthalmic solution 1 drop right eye at bedtime      SYNTHROID 150 MCG tablet Take 150 mcg by mouth daily      lisinopril (PRINIVIL;ZESTRIL) 10 MG tablet Take 10 mg by mouth daily      acetaminophen (TYLENOL) 325 MG tablet Take 650 mg by mouth every 4 hours as needed for Pain      nitroGLYCERIN (NITROSTAT) 0.4 MG SL tablet Place 0.4 mg under the tongue every 5 minutes as needed for Chest pain up to max of 3 total doses. If no relief after 1 dose, call 911.  Turmeric 400 MG CAPS Take 400 mg by mouth daily      SODIUM CHLORIDE PO Take 1 g by mouth 3 times daily      carvedilol (COREG) 3.125 MG tablet Take 3.125 mg by mouth 2 times daily (with meals)      folic acid (FOLVITE) 1 MG tablet Take 1 mg by mouth daily      Multiple Vitamin (MULTIVITAMIN ADULT PO) Take by mouth daily      Lutein 20 MG TABS Take 20 mg by mouth daily      [DISCONTINUED] Calcium Carbonate-Vitamin D (OYSTER SHELL CALCIUM/D) 500-200 MG-UNIT TABS Take 1 tablet by mouth 3 times daily (with meals) (Patient not taking: Reported on 1/26/2022)      [DISCONTINUED] polyethylene glycol (MIRALAX) 17 g PACK packet Take 17 g by mouth daily  (Patient not taking: Reported on 1/26/2022)      [DISCONTINUED] Coenzyme Q10-Vitamin E (COQ10-VITAMIN E) 100-10 MG-UNIT CAPS Take 400 mg by mouth daily (Patient not taking: Reported on 1/26/2022)      [DISCONTINUED] Lactobacillus (PROBIOTIC ACIDOPHILUS PO) Take by mouth daily 15 Billion cell cap daily (Patient not taking: Reported on 1/26/2022)      [DISCONTINUED] ASPIRIN PO Take 81 mg by mouth daily (Patient not taking: Reported on 1/26/2022)      [DISCONTINUED] loperamide (IMODIUM) 2 MG capsule Take 2 mg by mouth 3 times daily as needed for Diarrhea (Patient not taking: Reported on 1/26/2022)       No facility-administered encounter medications on file as of 1/26/2022.       Current Outpatient Medications on File Prior to Visit   Medication Sig Dispense Refill    cholestyramine (QUESTRAN) 4 g packet Take 1 packet by mouth 3 times daily (with meals)      omeprazole (PRILOSEC) 40 MG delayed release capsule Take 40 mg by mouth daily      clopidogrel (PLAVIX) 75 MG tablet Take 75 mg by mouth daily      tamsulosin (FLOMAX) 0.4 MG capsule Take 1 capsule by mouth 2 times daily 180 capsule 3    mupirocin (BACTROBAN) 2 % ointment Apply topically 3 times daily      calcium gluconate 500 MG tablet Take 500 mg by mouth daily      ferrous sulfate (FE TABS 325) 325 (65 Fe) MG EC tablet Take 325 mg by mouth daily (with breakfast)      ELIQUIS 5 MG TABS tablet Take 5 mg by mouth 2 times daily      brimonidine (ALPHAGAN) 0.2 % ophthalmic solution 3 times daily 1 drop in right eye three times daily      dorzolamide-timolol (COSOPT) 22.3-6.8 MG/ML ophthalmic solution 1 drop in right eye twice daily      REPATHA SURECLICK 026 MG/ML SOAJ Inject 140 mg into the skin every 14 days      latanoprost (XALATAN) 0.005 % ophthalmic solution 1 drop right eye at bedtime      SYNTHROID 150 MCG tablet Take 150 mcg by mouth daily      lisinopril (PRINIVIL;ZESTRIL) 10 MG tablet Take 10 mg by mouth daily      acetaminophen (TYLENOL) 325 MG tablet Take 650 mg by mouth every 4 hours as needed for Pain      nitroGLYCERIN (NITROSTAT) 0.4 MG SL tablet Place 0.4 mg under the tongue every 5 minutes as needed for Chest pain up to max of 3 total doses. If no relief after 1 dose, call 911.  Turmeric 400 MG CAPS Take 400 mg by mouth daily      SODIUM CHLORIDE PO Take 1 g by mouth 3 times daily      carvedilol (COREG) 3.125 MG tablet Take 3.125 mg by mouth 2 times daily (with meals)      folic acid (FOLVITE) 1 MG tablet Take 1 mg by mouth daily      Multiple Vitamin (MULTIVITAMIN ADULT PO) Take by mouth daily      Lutein 20 MG TABS Take 20 mg by mouth daily       No current facility-administered medications on file prior to visit.      Glucagon and Ferrous sulfate  History reviewed. No pertinent family history. Social History     Tobacco Use   Smoking Status Former Smoker    Packs/day: 1.00    Types: Cigarettes    Quit date: 2000    Years since quittin.5   Smokeless Tobacco Never Used       Social History     Substance and Sexual Activity   Alcohol Use Yes    Comment: 2 mixed drinks a week       Review of Systems   Constitutional: Negative for appetite change, chills and fever. Eyes: Negative for redness and visual disturbance. Respiratory: Negative for cough, shortness of breath and wheezing. Cardiovascular: Negative for chest pain and leg swelling. Gastrointestinal: Negative for abdominal pain, constipation, nausea and vomiting. Genitourinary: Positive for difficulty urinating. Negative for decreased urine volume, dysuria, enuresis, flank pain, frequency, hematuria, penile discharge, penile pain, scrotal swelling, testicular pain and urgency. Musculoskeletal: Negative for back pain, joint swelling and myalgias. Skin: Negative for color change, rash and wound. Neurological: Negative for dizziness, tremors and numbness. Hematological: Negative for adenopathy. Does not bruise/bleed easily. /77   Pulse 71   Temp 97.3 °F (36.3 °C) (Infrared)   Ht 5' 8\" (1.727 m)   Wt 169 lb (76.7 kg)   BMI 25.70 kg/m²       PHYSICAL EXAM:  Constitutional: Patient in no acute distress; Neuro: alert and oriented to person place and time. Psych: Mood and affect normal.  Lungs: Respiratory effort normal  Abdomen: Soft, non-tender, non-distended  Rectal: Deferred    Lab Results   Component Value Date    BUN 13 2022     Lab Results   Component Value Date    CREATININE 0.98 2022     No results found for: PSA    ASSESSMENT:   Diagnosis Orders   1. BPH with obstruction/lower urinary tract symptoms  DE MEASUREMENT,POST-VOID RESIDUAL VOLUME BY US,NON-IMAGING   2. Other constipation  XR ABDOMEN (KUB) (SINGLE AP VIEW)   3. Incomplete bladder emptying  ID MEASUREMENT,POST-VOID RESIDUAL VOLUME BY US,NON-IMAGING    Culture, Urine    Urinalysis with Microscopic    Comprehensive Metabolic Panel    CBC With Auto Differential   4. Other prostatic inflammatory diseases           PLAN:  We will check a urinalysis and culture. We will call him with results. This urine culture was obtained with straight cath. We will continue antibiotics even if urine culture is negative for UTI. We did straight cath the patient in the office today with approximately 240 mL obtained. We will give him a course of antibiotics for possible prostatitis    If patient is unable to urinate or develops suprapubic pain we do recommend that he goes to the emergency room versus come to our office tomorrow morning. Continue Flomax twice a day    We will check a CBC and a CMP we will call him with results    We will obtain a KUB to more fully evaluate his diarrhea and make sure he does not have any evidence of bowel obstruction or constipation.     Follow-up in 1 week with PVR

## 2022-01-27 ENCOUNTER — TELEPHONE (OUTPATIENT)
Dept: UROLOGY | Age: 72
End: 2022-01-27

## 2022-01-27 LAB
CULTURE: NO GROWTH
Lab: NORMAL
SPECIMEN DESCRIPTION: NORMAL

## 2022-01-27 NOTE — TELEPHONE ENCOUNTER
Patient was advised of results/repsonse. He noted that he has been doing well urinating and had a good night last night.

## 2022-01-27 NOTE — TELEPHONE ENCOUNTER
----- Message from Klever Loco PA-C sent at 1/27/2022 10:27 AM EST -----  Please let him know that his kidney function was normal. His sodium was low but consistent with previous lab work. His white blood cell count was normal as well as his hemoglobin. The x-ray we obtained did not show any significant constipation but did show that his bowel is sluggish. If he continues to have issues with his bowels his PCP may need to order a CAT scan. We will call him once his urine culture results return.

## 2022-01-27 NOTE — RESULT ENCOUNTER NOTE
Please let him know that his kidney function was normal. His sodium was low but consistent with previous lab work. His white blood cell count was normal as well as his hemoglobin. The x-ray we obtained did not show any significant constipation but did show that his bowel is sluggish. If he continues to have issues with his bowels his PCP may need to order a CAT scan. We will call him once his urine culture results return.

## 2022-01-28 ENCOUNTER — TELEPHONE (OUTPATIENT)
Dept: UROLOGY | Age: 72
End: 2022-01-28

## 2022-01-28 NOTE — TELEPHONE ENCOUNTER
----- Message from TOAN Love - CNP sent at 1/28/2022  8:20 AM EST -----  Call pt - urine cx reviewed and negative for UTI & for significant microhematuria

## 2022-12-20 RX ORDER — TAMSULOSIN HYDROCHLORIDE 0.4 MG/1
CAPSULE ORAL
Qty: 180 CAPSULE | Refills: 3 | OUTPATIENT
Start: 2022-12-20

## 2022-12-20 NOTE — TELEPHONE ENCOUNTER
Patient needs to have an appointment in order to refill his twice a day Flomax. He had had several cancellations earlier on in the year.

## 2022-12-27 ENCOUNTER — TELEPHONE (OUTPATIENT)
Dept: UROLOGY | Age: 72
End: 2022-12-27

## 2022-12-27 RX ORDER — TAMSULOSIN HYDROCHLORIDE 0.4 MG/1
0.4 CAPSULE ORAL 2 TIMES DAILY
Qty: 60 CAPSULE | Refills: 0 | Status: SHIPPED | OUTPATIENT
Start: 2022-12-27 | End: 2023-06-25

## 2022-12-27 NOTE — TELEPHONE ENCOUNTER
We will give 1 months worth of Flomax, there will be no further refills unless he is seen in the office after that.

## 2022-12-27 NOTE — TELEPHONE ENCOUNTER
Patient's wife called requesting a medication refill for the Flomax, twice a day. Patient has not been seen since last January and did not follow up from his last visit as advised. I advised the wife that he would need an appointment before further refills could be sent in. She said she didn't understand why he needed an appointment since he was doing well. I did tell her we need to follow up to continue to prescribe medication. She did schedule an appointment. Could they get a prescription for Flomax, twice a day, for 1 month sent to [facility], to cover him until his appointment?

## 2023-01-25 ENCOUNTER — TELEPHONE (OUTPATIENT)
Dept: UROLOGY | Age: 73
End: 2023-01-25

## 2023-01-25 RX ORDER — TAMSULOSIN HYDROCHLORIDE 0.4 MG/1
0.4 CAPSULE ORAL 2 TIMES DAILY
Qty: 60 CAPSULE | Refills: 0 | Status: SHIPPED | OUTPATIENT
Start: 2023-01-25 | End: 2023-07-24

## 2023-01-25 NOTE — TELEPHONE ENCOUNTER
We had to reschedule until next week because of weather. Wife wants to know if you can refill Tamsulosin since he had to be seen to get it. If you do a month then [facility]. If you do 90 days then [facility].

## 2023-02-01 ENCOUNTER — OFFICE VISIT (OUTPATIENT)
Dept: UROLOGY | Age: 73
End: 2023-02-01
Payer: MEDICARE

## 2023-02-01 VITALS
BODY MASS INDEX: 25.85 KG/M2 | SYSTOLIC BLOOD PRESSURE: 91 MMHG | WEIGHT: 170 LBS | DIASTOLIC BLOOD PRESSURE: 64 MMHG | TEMPERATURE: 97.5 F | HEART RATE: 74 BPM

## 2023-02-01 DIAGNOSIS — R33.9 INCOMPLETE BLADDER EMPTYING: ICD-10-CM

## 2023-02-01 DIAGNOSIS — K59.09 OTHER CONSTIPATION: ICD-10-CM

## 2023-02-01 DIAGNOSIS — N13.8 BPH WITH OBSTRUCTION/LOWER URINARY TRACT SYMPTOMS: Primary | ICD-10-CM

## 2023-02-01 DIAGNOSIS — N40.1 BPH WITH OBSTRUCTION/LOWER URINARY TRACT SYMPTOMS: Primary | ICD-10-CM

## 2023-02-01 PROCEDURE — 51798 US URINE CAPACITY MEASURE: CPT | Performed by: PHYSICIAN ASSISTANT

## 2023-02-01 PROCEDURE — 1123F ACP DISCUSS/DSCN MKR DOCD: CPT | Performed by: PHYSICIAN ASSISTANT

## 2023-02-01 PROCEDURE — 99213 OFFICE O/P EST LOW 20 MIN: CPT | Performed by: PHYSICIAN ASSISTANT

## 2023-02-01 PROCEDURE — 3078F DIAST BP <80 MM HG: CPT | Performed by: PHYSICIAN ASSISTANT

## 2023-02-01 PROCEDURE — 3074F SYST BP LT 130 MM HG: CPT | Performed by: PHYSICIAN ASSISTANT

## 2023-02-01 RX ORDER — TAMSULOSIN HYDROCHLORIDE 0.4 MG/1
0.4 CAPSULE ORAL 2 TIMES DAILY
Qty: 180 CAPSULE | Refills: 1 | Status: SHIPPED | OUTPATIENT
Start: 2023-02-01 | End: 2023-07-31

## 2023-02-01 RX ORDER — SPIRONOLACTONE 25 MG/1
25 TABLET ORAL DAILY
COMMUNITY

## 2023-02-01 RX ORDER — DAPAGLIFLOZIN 10 MG/1
10 TABLET, FILM COATED ORAL EVERY MORNING
COMMUNITY
Start: 2023-01-18

## 2023-02-01 RX ORDER — FUROSEMIDE 40 MG/1
40 TABLET ORAL PRN
COMMUNITY

## 2023-02-01 ASSESSMENT — ENCOUNTER SYMPTOMS
CONSTIPATION: 1
COLOR CHANGE: 0
SHORTNESS OF BREATH: 0
BACK PAIN: 0
WHEEZING: 0
COUGH: 0
NAUSEA: 0
ABDOMINAL PAIN: 0
EYE REDNESS: 0
VOMITING: 0

## 2023-02-01 NOTE — PROGRESS NOTES
HPI:      Patient is a 74 y.o. male in no acute distress. He is alert and oriented to person, place, and time. History  7/2021 developed postoperative retention following a AAA repair with an endovascular graft and open exploration of endovascular AAA repair in 2 to continuous endoleak. Started on Flomax     [facility] Rehab facility attempted to remove the Hurst catheter, but patient was unable to urinate              Increased Flomax to twice per day     10/2021 cystoscopy showed bilobar hyperplasia with high riding bladder neck, minimal hyperplasia              PVP greenlight offered, but patient declined     Today  Patient is here today for follow-up BPH it has been over a year since we have seen him last.  Patient has canceled 4 appointments since his last visit. Patient previously seen for urinary retention. Patient urinates every 3-4 hours during the day. He has large volume nocturia at night. He has bowel movements most days. He does have heart failure with an ejection fraction of 15 to 20%. Patient had lab work approximately 2 weeks ago which showed a BUN of 23 and a creatinine of 1.60. Patient did have recent issues with hypotension and had to have some of his medications discontinued including Entresto. Bladderscan performed in office today: Pt voided -4 hours ago, PVR - 231 mL.         Past Medical History:   Diagnosis Date    Abdominal aortic aneurysm (AAA) without rupture (HCC)     Anemia, unspecified     Aortoiliac occlusive disease (HCC)     Atrial fibrillation (HCC)     Chronic ischemic heart disease, unspecified     Essential hypertension     Generalized muscle weakness     Graves disease     Hx of thyroid irradiation     Hypo-osmolality and hyponatremia     Hypothyroidism, unspecified     Other acute postprocedural pain     Other hyperlipidemia     Primary open angle glaucoma (POAG) of right eye, mild stage     Primary open angle glaucoma of right eye, mild stage     Urinary retention      Past Surgical History:   Procedure Laterality Date    ABDOMINAL AORTIC ANEURYSM REPAIR, ENDOVASCULAR  06/2020    APPENDECTOMY  2017    CAROTID ENDARTERECTOMY Right 2017    COLONOSCOPY      Dr. Geraldo Ortiz  01/2021    CC/TEDDY placed    EYE SURGERY Right 10/05/2020    Avastin (Bevacizumab) 1.25mg intravitreal injection OD (right eye)    EYE SURGERY Right 11/09/2020    Panretinal photocoagulation (PRP) OD (right eye)    [address]    PPM and Kle[facility] Joshua      Dr. Won Painting 9     Outpatient Encounter Medications as of 2/1/2023   Medication Sig Dispense Refill    FARXIGA 10 MG tablet Take 10 mg by mouth every morning      Calcium Carb-Cholecalciferol (CALCIUM CARBONATE-VITAMIN D3 PO) Take by mouth in the morning, at noon, and at bedtime      Metoprolol Succinate 25 MG CS24 Take by mouth Take 1/2 tablet once a day      spironolactone (ALDACTONE) 25 MG tablet Take 25 mg by mouth daily Take 1/2 tablet once a day      furosemide (LASIX) 40 MG tablet Take 40 mg by mouth as needed      tamsulosin (FLOMAX) 0.4 MG capsule Take 1 capsule by mouth 2 times daily 180 capsule 1    cholestyramine (QUESTRAN) 4 g packet Take 1 packet by mouth as needed      clopidogrel (PLAVIX) 75 MG tablet Take 75 mg by mouth daily      mupirocin (BACTROBAN) 2 % ointment Apply topically as needed      ferrous sulfate (FE TABS 325) 325 (65 Fe) MG EC tablet Take 325 mg by mouth daily (with breakfast)      ELIQUIS 5 MG TABS tablet Take 5 mg by mouth 2 times daily      brimonidine (ALPHAGAN) 0.2 % ophthalmic solution 3 times daily 1 drop in right eye three times daily      dorzolamide-timolol (COSOPT) 22.3-6.8 MG/ML ophthalmic solution 1 drop in right eye twice daily      REPATHA SURECLICK 588 MG/ML SOAJ Inject 140 mg into the skin every 14 days      latanoprost (XALATAN) 0.005 % ophthalmic solution 1 drop right eye at bedtime      SYNTHROID 150 MCG tablet Take 150 mcg by mouth daily      acetaminophen (TYLENOL) 325 MG tablet Take 650 mg by mouth every 4 hours as needed for Pain      Turmeric 400 MG CAPS Take 400 mg by mouth daily      folic acid (FOLVITE) 1 MG tablet Take 1 mg by mouth daily      Multiple Vitamin (MULTIVITAMIN ADULT PO) Take by mouth daily      Lutein 20 MG TABS Take 20 mg by mouth daily      [DISCONTINUED] tamsulosin (FLOMAX) 0.4 MG capsule Take 1 capsule by mouth 2 times daily 60 capsule 0    [DISCONTINUED] omeprazole (PRILOSEC) 40 MG delayed release capsule Take 40 mg by mouth daily (Patient not taking: Reported on 2/1/2023)      [DISCONTINUED] calcium gluconate 500 MG tablet Take 500 mg by mouth daily (Patient not taking: Reported on 2/1/2023)      lisinopril (PRINIVIL;ZESTRIL) 10 MG tablet Take 10 mg by mouth daily      nitroGLYCERIN (NITROSTAT) 0.4 MG SL tablet Place 0.4 mg under the tongue every 5 minutes as needed for Chest pain up to max of 3 total doses. If no relief after 1 dose, call 911. (Patient not taking: Reported on 2/1/2023)      carvedilol (COREG) 3.125 MG tablet Take 3.125 mg by mouth 2 times daily (with meals) (Patient not taking: Reported on 2/1/2023)      [DISCONTINUED] SODIUM CHLORIDE PO Take 1 g by mouth 3 times daily (Patient not taking: Reported on 2/1/2023)       No facility-administered encounter medications on file as of 2/1/2023.       Current Outpatient Medications on File Prior to Visit   Medication Sig Dispense Refill    FARXIGA 10 MG tablet Take 10 mg by mouth every morning      Calcium Carb-Cholecalciferol (CALCIUM CARBONATE-VITAMIN D3 PO) Take by mouth in the morning, at noon, and at bedtime      Metoprolol Succinate 25 MG CS24 Take by mouth Take 1/2 tablet once a day      spironolactone (ALDACTONE) 25 MG tablet Take 25 mg by mouth daily Take 1/2 tablet once a day      furosemide (LASIX) 40 MG tablet Take 40 mg by mouth as needed cholestyramine (QUESTRAN) 4 g packet Take 1 packet by mouth as needed      clopidogrel (PLAVIX) 75 MG tablet Take 75 mg by mouth daily      mupirocin (BACTROBAN) 2 % ointment Apply topically as needed      ferrous sulfate (FE TABS 325) 325 (65 Fe) MG EC tablet Take 325 mg by mouth daily (with breakfast)      ELIQUIS 5 MG TABS tablet Take 5 mg by mouth 2 times daily      brimonidine (ALPHAGAN) 0.2 % ophthalmic solution 3 times daily 1 drop in right eye three times daily      dorzolamide-timolol (COSOPT) 22.3-6.8 MG/ML ophthalmic solution 1 drop in right eye twice daily      REPATHA SURECLICK 631 MG/ML SOAJ Inject 140 mg into the skin every 14 days      latanoprost (XALATAN) 0.005 % ophthalmic solution 1 drop right eye at bedtime      SYNTHROID 150 MCG tablet Take 150 mcg by mouth daily      acetaminophen (TYLENOL) 325 MG tablet Take 650 mg by mouth every 4 hours as needed for Pain      Turmeric 400 MG CAPS Take 400 mg by mouth daily      folic acid (FOLVITE) 1 MG tablet Take 1 mg by mouth daily      Multiple Vitamin (MULTIVITAMIN ADULT PO) Take by mouth daily      Lutein 20 MG TABS Take 20 mg by mouth daily      lisinopril (PRINIVIL;ZESTRIL) 10 MG tablet Take 10 mg by mouth daily      nitroGLYCERIN (NITROSTAT) 0.4 MG SL tablet Place 0.4 mg under the tongue every 5 minutes as needed for Chest pain up to max of 3 total doses. If no relief after 1 dose, call 911. (Patient not taking: Reported on 2023)      carvedilol (COREG) 3.125 MG tablet Take 3.125 mg by mouth 2 times daily (with meals) (Patient not taking: Reported on 2023)       No current facility-administered medications on file prior to visit. Glucagon  No family history on file.   Social History     Tobacco Use   Smoking Status Former    Packs/day: 1.00    Types: Cigarettes    Quit date: 2000    Years since quittin.5   Smokeless Tobacco Never       Social History     Substance and Sexual Activity   Alcohol Use Yes    Comment: 2 mixed drinks a week       Review of Systems   Constitutional:  Negative for appetite change, chills and fever. Eyes:  Negative for redness and visual disturbance. Respiratory:  Negative for cough, shortness of breath and wheezing. Cardiovascular:  Negative for chest pain and leg swelling. Gastrointestinal:  Positive for constipation. Negative for abdominal pain, nausea and vomiting. Genitourinary:  Negative for decreased urine volume, difficulty urinating, dysuria, enuresis, flank pain, frequency, hematuria, penile discharge, penile pain, scrotal swelling, testicular pain and urgency. Positive for incomplete bladder emptying and nocturia   Musculoskeletal:  Negative for back pain, joint swelling and myalgias. Skin:  Negative for color change, rash and wound. Neurological:  Negative for dizziness, tremors and numbness. Hematological:  Negative for adenopathy. Does not bruise/bleed easily. BP 91/64 (Site: Left Upper Arm, Position: Sitting, Cuff Size: Medium Adult)   Pulse 74   Temp 97.5 °F (36.4 °C)   Wt 170 lb (77.1 kg)   BMI 25.85 kg/m²       PHYSICAL EXAM:  Constitutional: Patient in no acute distress; Neuro: alert and oriented to person place and time. Psych: Mood and affect normal.  Lungs: Respiratory effort normal  Abdomen: Soft, non-tender, non-distended   Rectal: deferred       Lab Results   Component Value Date    BUN 13 01/26/2022     Lab Results   Component Value Date    CREATININE 0.98 01/26/2022     No results found for: PSA    ASSESSMENT:   Diagnosis Orders   1. BPH with obstruction/lower urinary tract symptoms  NE TRENA POST-VOIDING RESIDUAL URINE&/BLADDER CAP      2. Incomplete bladder emptying        3. Other constipation              PLAN:  Continue Flomax twice a day. Patient does prefer not to be followed very often as he is multiple other appointments that he needs to keep.     Follow-up in 3 to 4 months with PVR

## 2023-02-01 NOTE — PATIENT INSTRUCTIONS
SURVEY:    You may be receiving a survey from [employer] regarding your visit today. Please complete the survey to enable us to provide the highest quality of care to you and your family. If you cannot score us a very good on any question, please call the office to discuss how we could have made your experience a very good one. Thank you.

## 2023-08-22 ENCOUNTER — OFFICE VISIT (OUTPATIENT)
Dept: UROLOGY | Age: 73
End: 2023-08-22
Payer: MEDICARE

## 2023-08-22 ENCOUNTER — TELEPHONE (OUTPATIENT)
Dept: UROLOGY | Age: 73
End: 2023-08-22

## 2023-08-22 ENCOUNTER — HOSPITAL ENCOUNTER (OUTPATIENT)
Age: 73
Setting detail: SPECIMEN
Discharge: HOME | End: 2023-08-22
Payer: MEDICARE

## 2023-08-22 VITALS — RESPIRATION RATE: 22 BRPM | WEIGHT: 197 LBS | BODY MASS INDEX: 29.95 KG/M2 | HEART RATE: 74 BPM | TEMPERATURE: 97.2 F

## 2023-08-22 DIAGNOSIS — R33.9 INCOMPLETE BLADDER EMPTYING: Primary | ICD-10-CM

## 2023-08-22 DIAGNOSIS — N40.1 BPH WITH OBSTRUCTION/LOWER URINARY TRACT SYMPTOMS: ICD-10-CM

## 2023-08-22 DIAGNOSIS — R33.9 INCOMPLETE BLADDER EMPTYING: ICD-10-CM

## 2023-08-22 DIAGNOSIS — N13.8 BPH WITH OBSTRUCTION/LOWER URINARY TRACT SYMPTOMS: ICD-10-CM

## 2023-08-22 DIAGNOSIS — N50.89 SCROTAL EDEMA: ICD-10-CM

## 2023-08-22 DIAGNOSIS — N50.89 SCROTAL SWELLING: ICD-10-CM

## 2023-08-22 PROCEDURE — 51798 US URINE CAPACITY MEASURE: CPT | Performed by: PHYSICIAN ASSISTANT

## 2023-08-22 PROCEDURE — 87086 URINE CULTURE/COLONY COUNT: CPT

## 2023-08-22 PROCEDURE — 99213 OFFICE O/P EST LOW 20 MIN: CPT | Performed by: PHYSICIAN ASSISTANT

## 2023-08-22 PROCEDURE — 1123F ACP DISCUSS/DSCN MKR DOCD: CPT | Performed by: PHYSICIAN ASSISTANT

## 2023-08-22 RX ORDER — BUMETANIDE 1 MG/1
TABLET ORAL
COMMUNITY
Start: 2023-08-22

## 2023-08-22 ASSESSMENT — ENCOUNTER SYMPTOMS
COUGH: 0
VOMITING: 0
BACK PAIN: 0
COLOR CHANGE: 0
EYE REDNESS: 0
NAUSEA: 0
SHORTNESS OF BREATH: 0
CONSTIPATION: 0
ABDOMINAL PAIN: 0
WHEEZING: 0

## 2023-08-22 NOTE — TELEPHONE ENCOUNTER
Patient's wife Zachery Reddy called office. We received referral from Cherelle Chaparro in [facility] for BPH and incomplete bladder emptying. Zachery Reddy wanted to get patient scheduled as soon as possible. Patient has dark reddish colored urine,swelling of left testicle. He also is retaining water with swelling of legs and feet. Zachery Reddy advised they were going to be calling patient's Cardiologist. How soon should we schedule patient?

## 2023-08-22 NOTE — PATIENT INSTRUCTIONS
SURVEY:     You may be receiving a survey from [employer] regarding your visit today. Please complete the survey to enable us to provide the highest quality of care to you and your family. If you cannot score us a very good on any question, please call the office to discuss how we could have made your experience a very good one.      Thank you,

## 2023-08-22 NOTE — TELEPHONE ENCOUNTER
Writer called patient's wife back and advised her of response. We will add patient to schedule for today.

## 2023-08-22 NOTE — PROGRESS NOTES
HPI:      Patient is a 74 y.o. male in no acute distress. He is alert and oriented to person, place, and time. History  7/2021 developed postoperative retention following a AAA repair with an endovascular graft and open exploration of endovascular AAA repair in 2 to continuous endoleak. Started on Flomax     [facility] Rehab facility attempted to remove the Hurst catheter, but patient was unable to urinate              Increased Flomax to twice per day     10/2021 cystoscopy showed bilobar hyperplasia with high riding bladder neck, minimal hyperplasia              PVP greenlight offered, but patient declined    Known incomplete bladder emptying     Today  Patient is here today for complaints of \BPH and left testicular swelling. Patient is established patient here. Over the past 2 years patient has canceled appointment 6 times. Takes Flomax twice a day. He has been offered PVP greenlight in the past but declined. Patient's wife called our office today and we did accommodate him. Patient does have a significant heart failure history. Patient does have edema in his bilateral lower extremities up to his abdomen. Approximately 3 weeks ago he had a abdominal ultrasound which showed mild ascites. Patient is followed by cardiology and they recently changed his diuretics. Patient denies any urgency or frequency. He is known to have incomplete bladder emptying. He voided 300 mL and had a PVR of 363. Patient does have a history of PVRs in the mid to high 200s.       Past Medical History:   Diagnosis Date    Abdominal aortic aneurysm (AAA) without rupture (HCC)     Anemia, unspecified     Aortoiliac occlusive disease (HCC)     Atrial fibrillation (HCC)     Chronic ischemic heart disease, unspecified     Essential hypertension     Generalized muscle weakness     Graves disease     Hx of thyroid irradiation     Hypo-osmolality and hyponatremia

## 2023-08-23 LAB
MICROORGANISM SPEC CULT: NO GROWTH
SPECIMEN DESCRIPTION: NORMAL

## 2023-08-24 ENCOUNTER — TELEPHONE (OUTPATIENT)
Dept: UROLOGY | Age: 73
End: 2023-08-24

## 2023-08-24 NOTE — TELEPHONE ENCOUNTER
----- Message from TOAN Bishop - CNP sent at 8/24/2023  9:09 AM EDT -----  Call pt - urine cx reviewed and negative for UTI

## 2023-08-24 NOTE — TELEPHONE ENCOUNTER
Writer called patient and spoke to patient's wife Luz Granados. Advised her of results. She voiced understanding.

## 2023-08-28 ENCOUNTER — TELEPHONE (OUTPATIENT)
Dept: UROLOGY | Age: 73
End: 2023-08-28

## 2023-08-28 RX ORDER — TAMSULOSIN HYDROCHLORIDE 0.4 MG/1
0.4 CAPSULE ORAL 2 TIMES DAILY
Qty: 180 CAPSULE | Refills: 1 | Status: SHIPPED | OUTPATIENT
Start: 2023-08-28 | End: 2024-02-24

## 2023-10-06 ENCOUNTER — HOSPITAL ENCOUNTER (OUTPATIENT)
Age: 73
Setting detail: SPECIMEN
Discharge: HOME | End: 2023-10-06
Payer: MEDICARE

## 2023-10-06 LAB
ALBUMIN SERPL-MCNC: 3.7 G/DL (ref 3.5–5.2)
ALBUMIN/GLOB SERPL: 1.2 {RATIO} (ref 1–2.5)
ALP SERPL-CCNC: 131 U/L (ref 40–129)
ALT SERPL-CCNC: 8 U/L (ref 5–41)
ANION GAP SERPL CALCULATED.3IONS-SCNC: 14 MMOL/L (ref 9–17)
AST SERPL-CCNC: 11 U/L
BACTERIA URNS QL MICRO: ABNORMAL
BILIRUB SERPL-MCNC: 0.7 MG/DL (ref 0.3–1.2)
BILIRUB UR QL STRIP: NEGATIVE
BUN SERPL-MCNC: 24 MG/DL (ref 8–23)
BUN/CREAT SERPL: 20 (ref 9–20)
CALCIUM SERPL-MCNC: 9.1 MG/DL (ref 8.6–10.4)
CHLORIDE SERPL-SCNC: 94 MMOL/L (ref 98–107)
CLARITY UR: CLEAR
CO2 SERPL-SCNC: 26 MMOL/L (ref 20–31)
COLOR UR: YELLOW
CREAT SERPL-MCNC: 1.2 MG/DL (ref 0.7–1.2)
EPI CELLS #/AREA URNS HPF: ABNORMAL /HPF (ref 0–5)
ERYTHROCYTE [DISTWIDTH] IN BLOOD BY AUTOMATED COUNT: 14.6 % (ref 11.8–14.4)
GFR SERPL CREATININE-BSD FRML MDRD: >60 ML/MIN/1.73M2
GLUCOSE SERPL-MCNC: 127 MG/DL (ref 70–99)
GLUCOSE UR STRIP-MCNC: NEGATIVE MG/DL
HCT VFR BLD AUTO: 24.4 % (ref 40.7–50.3)
HGB BLD-MCNC: 8.1 G/DL (ref 13–17)
HGB UR QL STRIP.AUTO: ABNORMAL
KETONES UR STRIP-MCNC: NEGATIVE MG/DL
LEUKOCYTE ESTERASE UR QL STRIP: ABNORMAL
MCH RBC QN AUTO: 31.6 PG (ref 25.2–33.5)
MCHC RBC AUTO-ENTMCNC: 33.2 G/DL (ref 28.4–34.8)
MCV RBC AUTO: 95.3 FL (ref 82.6–102.9)
NITRITE UR QL STRIP: NEGATIVE
NRBC BLD-RTO: 0 PER 100 WBC
PH UR STRIP: 6 [PH] (ref 5–9)
PLATELET # BLD AUTO: 216 K/UL (ref 138–453)
PMV BLD AUTO: 9.2 FL (ref 8.1–13.5)
POTASSIUM SERPL-SCNC: 3.5 MMOL/L (ref 3.7–5.3)
PROT SERPL-MCNC: 6.7 G/DL (ref 6.4–8.3)
PROT UR STRIP-MCNC: NEGATIVE MG/DL
RBC # BLD AUTO: 2.56 M/UL (ref 4.21–5.77)
RBC #/AREA URNS HPF: ABNORMAL /HPF (ref 0–2)
SODIUM SERPL-SCNC: 134 MMOL/L (ref 135–144)
SP GR UR STRIP: 1.01 (ref 1.01–1.02)
UROBILINOGEN UR STRIP-ACNC: NORMAL EU/DL (ref 0–1)
WBC #/AREA URNS HPF: ABNORMAL /HPF (ref 0–5)
WBC OTHER # BLD: 9.1 K/UL (ref 3.5–11.3)

## 2023-10-06 PROCEDURE — 36415 COLL VENOUS BLD VENIPUNCTURE: CPT

## 2023-10-06 PROCEDURE — 80053 COMPREHEN METABOLIC PANEL: CPT

## 2023-10-06 PROCEDURE — 85027 COMPLETE CBC AUTOMATED: CPT

## 2023-10-08 LAB
MICROORGANISM SPEC CULT: ABNORMAL
SPECIMEN DESCRIPTION: ABNORMAL

## 2023-10-09 ENCOUNTER — HOSPITAL ENCOUNTER (OUTPATIENT)
Age: 73
Setting detail: SPECIMEN
Discharge: HOME | End: 2023-10-09
Payer: MEDICARE

## 2023-10-09 LAB
ALBUMIN SERPL-MCNC: 3.9 G/DL (ref 3.5–5.2)
ALBUMIN/GLOB SERPL: 1.1 {RATIO} (ref 1–2.5)
ALP SERPL-CCNC: 158 U/L (ref 40–129)
ALT SERPL-CCNC: 12 U/L (ref 5–41)
ANION GAP SERPL CALCULATED.3IONS-SCNC: 13 MMOL/L (ref 9–17)
AST SERPL-CCNC: 17 U/L
BASOPHILS # BLD: 0.06 K/UL (ref 0–0.2)
BASOPHILS NFR BLD: 1 % (ref 0–2)
BILIRUB SERPL-MCNC: 0.5 MG/DL (ref 0.3–1.2)
BUN SERPL-MCNC: 23 MG/DL (ref 8–23)
BUN/CREAT SERPL: 19 (ref 9–20)
CALCIUM SERPL-MCNC: 9.2 MG/DL (ref 8.6–10.4)
CHLORIDE SERPL-SCNC: 93 MMOL/L (ref 98–107)
CO2 SERPL-SCNC: 30 MMOL/L (ref 20–31)
CREAT SERPL-MCNC: 1.2 MG/DL (ref 0.7–1.2)
EOSINOPHIL # BLD: 0.19 K/UL (ref 0–0.44)
EOSINOPHILS RELATIVE PERCENT: 2 % (ref 1–4)
ERYTHROCYTE [DISTWIDTH] IN BLOOD BY AUTOMATED COUNT: 14.7 % (ref 11.8–14.4)
GFR SERPL CREATININE-BSD FRML MDRD: >60 ML/MIN/1.73M2
GLUCOSE SERPL-MCNC: 107 MG/DL (ref 70–99)
HCT VFR BLD AUTO: 26 % (ref 40.7–50.3)
HGB BLD-MCNC: 8.1 G/DL (ref 13–17)
IMM GRANULOCYTES # BLD AUTO: 0.04 K/UL (ref 0–0.3)
IMM GRANULOCYTES NFR BLD: 1 %
LYMPHOCYTES # BLD: 10 % (ref 24–43)
LYMPHOCYTES NFR BLD: 0.79 K/UL (ref 1.1–3.7)
MCH RBC QN AUTO: 30.5 PG (ref 25.2–33.5)
MCHC RBC AUTO-ENTMCNC: 31.2 G/DL (ref 28.4–34.8)
MCV RBC AUTO: 97.7 FL (ref 82.6–102.9)
MONOCYTES NFR BLD: 0.89 K/UL (ref 0.1–1.2)
MONOCYTES NFR BLD: 11 % (ref 3–12)
NEUTROPHILS NFR BLD: 75 % (ref 36–65)
NEUTS SEG NFR BLD: 5.88 K/UL (ref 1.5–8.1)
NRBC BLD-RTO: 0 PER 100 WBC
PLATELET # BLD AUTO: 272 K/UL (ref 138–453)
PMV BLD AUTO: 9.5 FL (ref 8.1–13.5)
POTASSIUM SERPL-SCNC: 3.4 MMOL/L (ref 3.7–5.3)
PROT SERPL-MCNC: 7.3 G/DL (ref 6.4–8.3)
RBC # BLD AUTO: 2.66 M/UL (ref 4.21–5.77)
SODIUM SERPL-SCNC: 136 MMOL/L (ref 135–144)
WBC OTHER # BLD: 7.9 K/UL (ref 3.5–11.3)

## 2023-10-09 PROCEDURE — 80053 COMPREHEN METABOLIC PANEL: CPT

## 2023-10-09 PROCEDURE — 36415 COLL VENOUS BLD VENIPUNCTURE: CPT

## 2023-10-09 PROCEDURE — 85025 COMPLETE CBC W/AUTO DIFF WBC: CPT

## 2023-10-16 ENCOUNTER — HOSPITAL ENCOUNTER (OUTPATIENT)
Age: 73
Setting detail: SPECIMEN
Discharge: HOME | End: 2023-10-16

## 2024-08-12 NOTE — TELEPHONE ENCOUNTER
The patient's last appointment was 08.23.2024. He does not currently have a follow up appointment. Mr Valdivia was scheduled for one on 01.23.2024, but he canceled.     Please advise

## 2024-08-13 RX ORDER — TAMSULOSIN HYDROCHLORIDE 0.4 MG/1
0.4 CAPSULE ORAL 2 TIMES DAILY
Qty: 60 CAPSULE | Refills: 0 | Status: SHIPPED | OUTPATIENT
Start: 2024-08-13 | End: 2024-08-27 | Stop reason: SDUPTHER

## 2024-08-13 RX ORDER — TAMSULOSIN HYDROCHLORIDE 0.4 MG/1
0.4 CAPSULE ORAL 2 TIMES DAILY
Qty: 180 CAPSULE | Refills: 1 | OUTPATIENT
Start: 2024-08-13

## 2024-08-13 NOTE — TELEPHONE ENCOUNTER
Mrs Valdivia returned the office phone call to schedule an appointment for Mr Valdivia. He is scheduled for Tuesday August 27 at 3pm. Mrs Valdivia would like to know if they could get enough of the tamsulosin to last him until his appointment. He will be out in about a week. Medication Pended

## 2024-08-13 NOTE — TELEPHONE ENCOUNTER
Writer attempted to contact patient to schedule an appointment and was unsuccessful. I did leave a detailed message with the office phone number for him to return our call.

## 2024-08-27 ENCOUNTER — OFFICE VISIT (OUTPATIENT)
Dept: UROLOGY | Age: 74
End: 2024-08-27

## 2024-08-27 VITALS
RESPIRATION RATE: 20 BRPM | DIASTOLIC BLOOD PRESSURE: 48 MMHG | HEART RATE: 68 BPM | TEMPERATURE: 98.5 F | HEIGHT: 67 IN | SYSTOLIC BLOOD PRESSURE: 96 MMHG | BODY MASS INDEX: 29.22 KG/M2 | WEIGHT: 186.2 LBS

## 2024-08-27 DIAGNOSIS — N13.8 BPH WITH OBSTRUCTION/LOWER URINARY TRACT SYMPTOMS: Primary | ICD-10-CM

## 2024-08-27 DIAGNOSIS — N40.1 BPH WITH OBSTRUCTION/LOWER URINARY TRACT SYMPTOMS: Primary | ICD-10-CM

## 2024-08-27 DIAGNOSIS — R33.9 INCOMPLETE BLADDER EMPTYING: ICD-10-CM

## 2024-08-27 RX ORDER — TAMSULOSIN HYDROCHLORIDE 0.4 MG/1
0.4 CAPSULE ORAL 2 TIMES DAILY
Qty: 180 CAPSULE | Refills: 3 | Status: SHIPPED | OUTPATIENT
Start: 2024-08-27

## 2024-08-27 ASSESSMENT — ENCOUNTER SYMPTOMS
EYE REDNESS: 0
VOMITING: 0
SHORTNESS OF BREATH: 0
WHEEZING: 0
CONSTIPATION: 0
COLOR CHANGE: 0
NAUSEA: 0
BACK PAIN: 0
COUGH: 0
ABDOMINAL PAIN: 0

## 2024-08-27 NOTE — PROGRESS NOTES
mouth every 4 hours as needed for Pain      folic acid (FOLVITE) 1 MG tablet Take 1 tablet by mouth daily      Multiple Vitamin (MULTIVITAMIN ADULT PO) Take by mouth daily      Lutein 20 MG TABS Take 20 mg by mouth daily      [DISCONTINUED] tamsulosin (FLOMAX) 0.4 MG capsule Take 1 capsule by mouth 2 times daily 60 capsule 0    spironolactone (ALDACTONE) 25 MG tablet Take 1 tablet by mouth daily Take 1/2 tablet once a day (Patient not taking: Reported on 8/27/2024)      furosemide (LASIX) 40 MG tablet Take 40 mg by mouth as needed (Patient not taking: Reported on 8/22/2023)      cholestyramine (QUESTRAN) 4 g packet Take 1 packet by mouth as needed (Patient not taking: Reported on 8/27/2024)      mupirocin (BACTROBAN) 2 % ointment Apply topically as needed (Patient not taking: Reported on 8/27/2024)      lisinopril (PRINIVIL;ZESTRIL) 10 MG tablet Take 10 mg by mouth daily (Patient not taking: Reported on 8/22/2023)      nitroGLYCERIN (NITROSTAT) 0.4 MG SL tablet Place 0.4 mg under the tongue every 5 minutes as needed for Chest pain up to max of 3 total doses. If no relief after 1 dose, call 911.  (Patient not taking: Reported on 2/1/2023)      carvedilol (COREG) 3.125 MG tablet Take 3.125 mg by mouth 2 times daily (with meals) (Patient not taking: Reported on 2/1/2023)      [DISCONTINUED] Turmeric 400 MG CAPS Take 400 mg by mouth daily       No facility-administered encounter medications on file as of 8/27/2024.      Current Outpatient Medications on File Prior to Visit   Medication Sig Dispense Refill    bumetanide (BUMEX) 1 MG tablet Take 1 tablet by mouth daily.  Can take a second dose 6 hours later for swelling or weight gain      FARXIGA 10 MG tablet Take 1 tablet by mouth every morning      Calcium Carb-Cholecalciferol (CALCIUM CARBONATE-VITAMIN D3 PO) Take by mouth in the morning, at noon, and at bedtime      Metoprolol Succinate 25 MG CS24 Take by mouth Take 1/2 tablet once a day      clopidogrel (PLAVIX) 75  Incomplete bladder emptying            PLAN:  Continue Flomax twice a day    Patient is not a good candidate for PVP greenlight secondary to his extensive heart history    Patient prefers to be seen yearly    Follow-up in 1 year with PVR

## 2024-08-27 NOTE — PATIENT INSTRUCTIONS
SURVEY:     You may be receiving a survey from [employer] regarding your visit today.     Please complete the survey to enable us to provide the highest quality of care to you and your family.     If you cannot score us a very good on any question, please call the office to discuss how we could have made your experience a very good one.     Thank you,

## 2025-01-10 ENCOUNTER — OFFICE VISIT (OUTPATIENT)
Dept: UROLOGY | Age: 75
End: 2025-01-10

## 2025-01-10 ENCOUNTER — HOSPITAL ENCOUNTER (OUTPATIENT)
Age: 75
Setting detail: SPECIMEN
Discharge: HOME | End: 2025-01-10
Payer: MEDICARE

## 2025-01-10 ENCOUNTER — TELEPHONE (OUTPATIENT)
Dept: UROLOGY | Age: 75
End: 2025-01-10

## 2025-01-10 VITALS — BODY MASS INDEX: 30.92 KG/M2 | TEMPERATURE: 96.9 F | RESPIRATION RATE: 24 BRPM | WEIGHT: 197 LBS

## 2025-01-10 DIAGNOSIS — R33.9 INCOMPLETE BLADDER EMPTYING: ICD-10-CM

## 2025-01-10 DIAGNOSIS — N40.1 BPH WITH OBSTRUCTION/LOWER URINARY TRACT SYMPTOMS: Primary | ICD-10-CM

## 2025-01-10 DIAGNOSIS — N40.1 BPH WITH OBSTRUCTION/LOWER URINARY TRACT SYMPTOMS: ICD-10-CM

## 2025-01-10 DIAGNOSIS — N13.8 BPH WITH OBSTRUCTION/LOWER URINARY TRACT SYMPTOMS: ICD-10-CM

## 2025-01-10 DIAGNOSIS — N13.8 BPH WITH OBSTRUCTION/LOWER URINARY TRACT SYMPTOMS: Primary | ICD-10-CM

## 2025-01-10 LAB
BILIRUB UR QL STRIP: NEGATIVE
CLARITY UR: CLEAR
COLOR UR: YELLOW
EPI CELLS #/AREA URNS HPF: NORMAL /HPF (ref 0–5)
GLUCOSE UR STRIP-MCNC: NEGATIVE MG/DL
HGB UR QL STRIP.AUTO: NEGATIVE
KETONES UR STRIP-MCNC: NEGATIVE MG/DL
LEUKOCYTE ESTERASE UR QL STRIP: NEGATIVE
NITRITE UR QL STRIP: NEGATIVE
PH UR STRIP: 5 [PH] (ref 5–9)
PROT UR STRIP-MCNC: NEGATIVE MG/DL
RBC #/AREA URNS HPF: NORMAL /HPF (ref 0–2)
SP GR UR STRIP: 1.01 (ref 1.01–1.02)
UROBILINOGEN UR STRIP-ACNC: NORMAL EU/DL (ref 0–1)
WBC #/AREA URNS HPF: NORMAL /HPF (ref 0–5)

## 2025-01-10 PROCEDURE — 87086 URINE CULTURE/COLONY COUNT: CPT

## 2025-01-10 PROCEDURE — 81001 URINALYSIS AUTO W/SCOPE: CPT

## 2025-01-10 RX ORDER — SULFAMETHOXAZOLE AND TRIMETHOPRIM 800; 160 MG/1; MG/1
1 TABLET ORAL 2 TIMES DAILY
Qty: 14 TABLET | Refills: 0 | Status: SHIPPED | OUTPATIENT
Start: 2025-01-10 | End: 2025-01-17

## 2025-01-10 NOTE — PROGRESS NOTES
Random bladderscan performed in office today:  Pt last voided 0800 this am and has not been able to urinate since then , scan = 338 mL     Verbal order from Dr. Nael HAMMER to place catheter.  New 16F coude tip de la fuente catheter placed under sterile technique without difficulty, with 10 mL sterile water instilled into balloon. Return of 320+ mL urine. Pt tolerated catheter insertion well. Pt was instructed on catheter care and sent home with printed information. Pt advised to call office if develops pain or fever, leaking around catheter, if catheter stops draining, or for any concerns.     De La Fuente 16F coude   Lot [id number]  Exp 07/28/2029        LIDOCAINE HYDROCHLORIDE JELLY 2%  Lot number: [id number]  Expiration date: 05/2026

## 2025-01-10 NOTE — TELEPHONE ENCOUNTER
Writer calls wife, she states he is sleeping. She is going to wake him up and bring him out for a bladder scan. Informed we are here until noon today. She is unsure of daily BM but she will check. Appt made today for scan. Also enc to call his cardiology office and let them know about the leg swelling. She verbalizes understanding.

## 2025-01-10 NOTE — TELEPHONE ENCOUNTER
Patient's wife Dionne calls stating Valentin is having trouble urinating. She states he has janae LE edema.  She reports last night before bed he could not urinate, woke up with urges and still couldn't go. He was able to go this morning ,he could only go an inch and a half in the urinal.  When asked if he is uncomfortable he states no not really. Please advise. Last visit was 8/27/24.

## 2025-01-10 NOTE — PROGRESS NOTES
HPI:          Patient is a 74 y.o. male in no acute distress.  He is alert and oriented to person, place, and time.         History  7/2021 developed postoperative retention following a AAA repair with an endovascular graft and open exploration of endovascular AAA repair in 2 to continuous endoleak.              Started on Flomax     [facility].  Rehab facility attempted to remove the Hurst catheter, but patient was unable to urinate              Increased Flomax to twice per day     10/2021 cystoscopy showed bilobar hyperplasia with high riding bladder neck, minimal hyperplasia              PVP greenlight offered, but patient declined     Known incomplete bladder emptying    Currently  Patient is here today with complaints of difficulty voiding.  He does state that he has been unable to void overnight.  We did random scan him today for 338 mL.  He still feels like he does not have to go.  He is not in pain.  He has been having some worsening issues with urination.  Past Medical History:   Diagnosis Date    Abdominal aortic aneurysm (AAA) without rupture (HCC)     Anemia, unspecified     Aortoiliac occlusive disease (HCC)     Atrial fibrillation (HCC)     Chronic ischemic heart disease, unspecified     Essential hypertension     Generalized muscle weakness     Graves disease     Hx of thyroid irradiation     Hypo-osmolality and hyponatremia     Hypothyroidism, unspecified     Other acute postprocedural pain     Other hyperlipidemia     Primary open angle glaucoma (POAG) of right eye, mild stage     Primary open angle glaucoma of right eye, mild stage     Urinary retention      Past Surgical History:   Procedure Laterality Date    ABDOMINAL AORTIC ANEURYSM REPAIR, ENDOVASCULAR  06/2020    APPENDECTOMY  2017    CAROTID ENDARTERECTOMY Right 2017    COLONOSCOPY      Dr. Zaida De La Fuente    CORONARY ANGIOPLASTY WITH STENT PLACEMENT  01/2021    CC/TEDDY placed    EYE SURGERY

## 2025-01-10 NOTE — TELEPHONE ENCOUNTER
Offer them a nursing visit to scan his bladder, make sure he is continue to take Flomax twice a day.  Patient does have a history of incomplete bladder emptying.  If he is not having a good bowel movement we recommend MiraLAX daily.  If he is having increased amount of leg swelling we do recommend that he contact his cardiologist as this could be a sign of heart failure exacerbation.

## 2025-01-11 LAB
MICROORGANISM SPEC CULT: NO GROWTH
SERVICE CMNT-IMP: NORMAL
SPECIMEN DESCRIPTION: NORMAL

## 2025-01-13 ENCOUNTER — TELEPHONE (OUTPATIENT)
Dept: UROLOGY | Age: 75
End: 2025-01-13

## 2025-01-13 NOTE — TELEPHONE ENCOUNTER
Patient and his wife called the office to report that he had blood in his urine on Friday night.  The patient continues to have hematuria, but his wife states it has improved since Friday.  The catheter is draining without problems.  The patient denies having a fever, blood clots in the urine, chills, nausea or vomiting.      The patient's urine culture was negative for infection.    This nurse advised the patient that the bleeding may be related to his prostate, as he does have BPH.  Instructed the patient to drink 64+ oz of water per day.  The patient and his wife verbalizes understanding and agreement.    The patient is scheduled to have his catheter removed on 01/14/2025.  The patient requested to change his nurse visit on 01/14/2025 to 0900 because he it is difficult for him to get to appointments by 0800.    Please advise if you would like this appointment changed to an office visit on 01/14/2024 at 0800 or if you would like the patient to follow up for an office visit 1-2 weeks later.

## 2025-01-13 NOTE — TELEPHONE ENCOUNTER
----- Message from TOAN Varghese - CNP sent at 1/13/2025  8:51 AM EST -----  Call pt - urine cx reviewed and negative for UTI & for significant microhematuria

## 2025-01-13 NOTE — TELEPHONE ENCOUNTER
Patient's wife informed of the provider's instructions to have catheter removed tomorrow and then follow up in 2-3 weeks.  Patient's wife verbalizes understanding.

## 2025-01-14 ENCOUNTER — TELEPHONE (OUTPATIENT)
Dept: UROLOGY | Age: 75
End: 2025-01-14

## 2025-01-14 ENCOUNTER — NURSE ONLY (OUTPATIENT)
Dept: UROLOGY | Age: 75
End: 2025-01-14

## 2025-01-14 VITALS
SYSTOLIC BLOOD PRESSURE: 90 MMHG | WEIGHT: 198 LBS | TEMPERATURE: 97.7 F | DIASTOLIC BLOOD PRESSURE: 52 MMHG | BODY MASS INDEX: 31.08 KG/M2

## 2025-01-14 DIAGNOSIS — N13.8 BPH WITH OBSTRUCTION/LOWER URINARY TRACT SYMPTOMS: ICD-10-CM

## 2025-01-14 DIAGNOSIS — N40.1 BPH WITH OBSTRUCTION/LOWER URINARY TRACT SYMPTOMS: ICD-10-CM

## 2025-01-14 DIAGNOSIS — R33.9 INCOMPLETE BLADDER EMPTYING: Primary | ICD-10-CM

## 2025-01-14 PROCEDURE — 90000 NO LOS: CPT | Performed by: NURSE PRACTITIONER

## 2025-01-14 NOTE — TELEPHONE ENCOUNTER
Patient had cath removed this morning. He is okay but hasn't urinated yet but he has not been home all day either. He drank 30 oz of water plus two bottles. He is not uncomfortable.  I told him to call in the morning with an update.  I told him to keep drinking. I told him if he gets uncomfortable in the night he will need to go to the ER.

## 2025-01-14 NOTE — PROGRESS NOTES
Pt had de la fuente catheter placed on 01/10/2025 for incomplete bladder emptying.    Balloon deflated on catheter and catheter removed. Patient tolerated procedure well.    Pt instructed to drink plenty of fluids, try to urinate q 2 hrs, call office in 6 hrs with update of amount voided, and if not voiding will need to return to office to have de la fuente replaced.   Also instructed to return to office or ER if goes >6 hrs without voiding or has strong urge to void/suprapubic discomfort and cannot urinate.  Pt verbalizes understanding of plan.  F/u 2-3 weeks.

## 2025-01-15 ENCOUNTER — TELEPHONE (OUTPATIENT)
Dept: UROLOGY | Age: 75
End: 2025-01-15

## 2025-01-15 NOTE — TELEPHONE ENCOUNTER
It can last up to 2 weeks.  Increase water intake.  Keep follow-up as planned, but call sooner if needed

## 2025-01-15 NOTE — TELEPHONE ENCOUNTER
Informed wife of the resposne.  She also said his urine is dark red or black from the bleeding.  How long should she let that go before she should be concerned?

## 2025-01-15 NOTE — TELEPHONE ENCOUNTER
Wife called and said patient has been able to urinate.  I told her to call if he has trouble or if he can't urinate after hours he would need to go to ER.  She does want to know if he can drink juice and coffee now or can he just drink water?

## 2025-01-16 ENCOUNTER — OFFICE VISIT (OUTPATIENT)
Dept: UROLOGY | Age: 75
End: 2025-01-16
Payer: MEDICARE

## 2025-01-16 VITALS
WEIGHT: 204 LBS | BODY MASS INDEX: 32.02 KG/M2 | SYSTOLIC BLOOD PRESSURE: 98 MMHG | DIASTOLIC BLOOD PRESSURE: 64 MMHG | TEMPERATURE: 97.9 F

## 2025-01-16 DIAGNOSIS — R33.9 URINARY RETENTION: Primary | ICD-10-CM

## 2025-01-16 DIAGNOSIS — N40.1 BPH WITH OBSTRUCTION/LOWER URINARY TRACT SYMPTOMS: ICD-10-CM

## 2025-01-16 DIAGNOSIS — N13.8 BPH WITH OBSTRUCTION/LOWER URINARY TRACT SYMPTOMS: ICD-10-CM

## 2025-01-16 PROCEDURE — 99214 OFFICE O/P EST MOD 30 MIN: CPT | Performed by: NURSE PRACTITIONER

## 2025-01-16 PROCEDURE — 3074F SYST BP LT 130 MM HG: CPT | Performed by: NURSE PRACTITIONER

## 2025-01-16 PROCEDURE — 3078F DIAST BP <80 MM HG: CPT | Performed by: NURSE PRACTITIONER

## 2025-01-16 PROCEDURE — 1123F ACP DISCUSS/DSCN MKR DOCD: CPT | Performed by: NURSE PRACTITIONER

## 2025-01-16 PROCEDURE — 51702 INSERT TEMP BLADDER CATH: CPT | Performed by: NURSE PRACTITIONER

## 2025-01-16 NOTE — PROGRESS NOTES
History  7/2021 developed postoperative retention following a AAA repair with an endovascular graft and open exploration of endovascular AAA repair in 2 to continuous endoleak.              Started on Flomax     [facility].  Rehab facility attempted to remove the Hurst catheter, but patient was unable to urinate              Increased Flomax to twice per day     10/2021 cystoscopy showed bilobar hyperplasia with high riding bladder neck, minimal hyperplasia              PVP greenlight offered, but patient declined     Known incomplete bladder emptying    Today  Here today due to difficulty with urination.  His Hurst catheter was removed on Tuesday.  He was initially urinating without issue, but urination has become progressively more difficult.  This is his second failed void trial unfortunately.  He is on Flomax twice per day.  He was offered PVP greenlight in 2021, but declined.  He is currently on Bactrim.    Plan  Repeat urine culture, but finish Bactrim as prescribed    Continue Flomax twice per day    Please Hurst catheter    Return to the office for cystoscopy.  We did briefly discuss PVP greenlight.  He was provided literature on this procedure as well so they can return to this appointment with questions.

## 2025-01-16 NOTE — PROGRESS NOTES
Random bladderscan performed in office today:  Pt last voided last night prior to bedtime, he tried to void during the night but was unable, scan = 380 mL     New 16F Coude de la fuente catheter placed in urethra under sterile technique without difficulty, with 10 mL sterile water instilled into balloon. Return of 400 mL urine. Pt tolerated catheter insertion well. Pt was instructed on catheter care and sent home with printed information. Pt advised to call office if develops pain or fever, leaking around catheter, if catheter stops draining, or for any concerns.     16 Coude catheter REF [id number]  LOT [id number]  Exp 2029-07-28    Lidocaine Gel  LOT [id number]  Exp 05/26

## 2025-01-22 ENCOUNTER — TELEPHONE (OUTPATIENT)
Dept: UROLOGY | Age: 75
End: 2025-01-22

## 2025-01-22 ENCOUNTER — PROCEDURE VISIT (OUTPATIENT)
Dept: UROLOGY | Age: 75
End: 2025-01-22
Payer: MEDICARE

## 2025-01-22 VITALS — WEIGHT: 207 LBS | TEMPERATURE: 96.9 F | BODY MASS INDEX: 32.49 KG/M2

## 2025-01-22 DIAGNOSIS — Z01.818 PREOP TESTING: Primary | ICD-10-CM

## 2025-01-22 DIAGNOSIS — R33.9 INCOMPLETE BLADDER EMPTYING: ICD-10-CM

## 2025-01-22 DIAGNOSIS — N40.1 BPH WITH OBSTRUCTION/LOWER URINARY TRACT SYMPTOMS: ICD-10-CM

## 2025-01-22 DIAGNOSIS — N13.8 BPH WITH OBSTRUCTION/LOWER URINARY TRACT SYMPTOMS: ICD-10-CM

## 2025-01-22 DIAGNOSIS — Z01.818 PRE-OP TESTING: ICD-10-CM

## 2025-01-22 DIAGNOSIS — R33.9 URINARY RETENTION: Primary | ICD-10-CM

## 2025-01-22 PROCEDURE — 99215 OFFICE O/P EST HI 40 MIN: CPT | Performed by: UROLOGY

## 2025-01-22 PROCEDURE — 1123F ACP DISCUSS/DSCN MKR DOCD: CPT | Performed by: UROLOGY

## 2025-01-22 PROCEDURE — 52000 CYSTOURETHROSCOPY: CPT | Performed by: UROLOGY

## 2025-01-22 PROCEDURE — 1159F MED LIST DOCD IN RCRD: CPT | Performed by: UROLOGY

## 2025-01-22 NOTE — PROGRESS NOTES
During cystoscopy the following was utilized on patient with no adverse affects:    45% SODIUM CHLORIDE 500 ML BAG  Lot number: [id number]  Expiration date: 12/31/2025      LIDOCAINE HYDROCHLORIDE JELLY 2%   Lot number: [id number]  Expiration date: 05/31/2026    CYSTOSCOPE  Lot Number:[id number]  Expiration Date: 10-

## 2025-01-22 NOTE — TELEPHONE ENCOUNTER
Writer calls patient back to notify him of this information. He went back and forth about coming back in today. He reports he voided x1 post cath pull. FRANCOISE Richardson notes for cath pull was 1222 pm. Patient's wife Dionne states she is going to bring him in now.

## 2025-01-22 NOTE — PROGRESS NOTES
HPI:          Patient is a 74 y.o. male in no acute distress.  He is alert and oriented to person, place, and time.         History  7/2021 developed postoperative retention following a AAA repair with an endovascular graft and open exploration of endovascular AAA repair in 2 to continuous endoleak.              Started on Flomax     [facility].  Rehab facility attempted to remove the Hurst catheter, but patient was unable to urinate              Increased Flomax to twice per day     10/2021 cystoscopy showed bilobar hyperplasia with high riding bladder neck, minimal hyperplasia              PVP greenlight offered, but patient declined     Known incomplete bladder emptying     Currently  Patient is here today for follow-up of BPH and urinary retention.  Patient is a Hurst catheter in place.  She is here today for lower check visualization.  We have discussed definitive therapy in the past with PVP greenlight.    Cystoscopy Procedure Note    Pre-operative Diagnosis: BPH with retention    Post-operative Diagnosis: Same     Surgeon: Nael    Assistants: None    Anesthesia : Local    Procedure Details   The risks, benefits, complications, treatment options, and expected outcomes were discussed with the patient. The patient concurred with the proposed plan, giving informed consent.    Cystoscopy was performed today under local anesthesia, using sterile technique. The patient was placed in the dorsal lithotomy position, prepped with CHG, and draped in the usual sterile fashion. A 14 [place] flexible cystoscope was used to systematically inspect both the urethra and bladder in their entirety.    Findings:  Anterior urethra: normal without strictures  Hyperplasia: trilobar  Bladder: Normal mucosa, without lesions.  Ureteral orifice(s) was/were seen in the normal position and effluxing clear urine  Trabeculations No  Diverticulum No  Description: Patient does have trilobar

## 2025-01-22 NOTE — TELEPHONE ENCOUNTER
Placed on schedule for PVP greenlight orignally on 2/11/25. Wife req 2/4/25 as a sooner date .[facility]/Dr Horne.  He is on plavix and eliquis.  He has not had recent EKG or labs, wife is aware this needs completed this week if possible.  Writer contacts cardiology office- they state 2/4/25 date should be enough time to give clearance for medications. Will fax paperwork once testing is completed.

## 2025-01-22 NOTE — TELEPHONE ENCOUNTER
Blood in the urine and stool can be very normal, but if he is only voiding small amounts please have him come back to the office now

## 2025-01-22 NOTE — TELEPHONE ENCOUNTER
The patient called in, he had a cystoscopy earlier today with catheter removal (this was around noon)  He is concerned because he has had little voiding amounts since he left the office.   He states that he has had 3-4 glasses of water.  He complains of having a lot of blood present in the urine and the stool.  The blood in the stool is new and he is very concerned. He has had a couple BM today and once after his procedure today which there was blood present.  Please advise   He does live about 20 minutes away from the [facility].

## 2025-01-22 NOTE — PROGRESS NOTES
Pt had de la fuente catheter placed on 01/16/2025 for urinary retention.    Balloon deflated on catheter and catheter removed. Patient tolerated procedure well.    Pt instructed to drink plenty of fluids, try to urinate q 2 hrs, call office in 6 hrs with update of amount voided, and if not voiding will need to return to office to have de la fuente replaced.   Also instructed to return to office or ER if goes >6 hrs without voiding or has strong urge to void/suprapubic discomfort and cannot urinate.  Pt verbalizes understanding of plan.

## 2025-01-22 NOTE — TELEPHONE ENCOUNTER
Wife Dionne calls back and states Valentin voided and they are not coming to office. Enc to go to nearest ER if he experiences retention tonight.

## 2025-01-23 ENCOUNTER — TELEPHONE (OUTPATIENT)
Dept: PREADMISSION TESTING | Age: 75
End: 2025-01-23

## 2025-01-23 ENCOUNTER — HOSPITAL ENCOUNTER (OUTPATIENT)
Age: 75
Discharge: HOME | End: 2025-01-23
Payer: MEDICARE

## 2025-01-23 DIAGNOSIS — Z01.818 PREOP TESTING: ICD-10-CM

## 2025-01-23 LAB
ALBUMIN SERPL-MCNC: 3.9 G/DL (ref 3.5–5.2)
ALBUMIN/GLOB SERPL: 1.5 {RATIO} (ref 1–2.5)
ALP SERPL-CCNC: 106 U/L (ref 40–129)
ALT SERPL-CCNC: 14 U/L (ref 10–50)
ANION GAP SERPL CALCULATED.3IONS-SCNC: 9 MMOL/L (ref 9–16)
AST SERPL-CCNC: 22 U/L (ref 10–50)
BASOPHILS # BLD: 0.07 K/UL (ref 0–0.2)
BASOPHILS NFR BLD: 1 % (ref 0–2)
BILIRUB SERPL-MCNC: 0.9 MG/DL (ref 0–1.2)
BUN SERPL-MCNC: 11 MG/DL (ref 8–23)
BUN/CREAT SERPL: 10 (ref 9–20)
CALCIUM SERPL-MCNC: 8.4 MG/DL (ref 8.6–10.4)
CHLORIDE SERPL-SCNC: 94 MMOL/L (ref 98–107)
CO2 SERPL-SCNC: 26 MMOL/L (ref 20–31)
CREAT SERPL-MCNC: 1.1 MG/DL (ref 0.7–1.2)
EKG ATRIAL RATE: 70 BPM
EKG Q-T INTERVAL: 482 MS
EKG QRS DURATION: 164 MS
EKG QTC CALCULATION (BAZETT): 520 MS
EKG R AXIS: -71 DEGREES
EKG T AXIS: 116 DEGREES
EKG VENTRICULAR RATE: 70 BPM
EOSINOPHIL # BLD: 0.21 K/UL (ref 0–0.44)
EOSINOPHILS RELATIVE PERCENT: 3 % (ref 1–4)
ERYTHROCYTE [DISTWIDTH] IN BLOOD BY AUTOMATED COUNT: 16.7 % (ref 11.8–14.4)
GFR, ESTIMATED: 73 ML/MIN/1.73M2
GLUCOSE SERPL-MCNC: 96 MG/DL (ref 74–99)
HCT VFR BLD AUTO: 33.1 % (ref 40.7–50.3)
HGB BLD-MCNC: 11.2 G/DL (ref 13–17)
IMM GRANULOCYTES # BLD AUTO: 0 K/UL (ref 0–0.3)
IMM GRANULOCYTES NFR BLD: 0 %
LYMPHOCYTES NFR BLD: 0.5 K/UL (ref 1.1–3.7)
LYMPHOCYTES RELATIVE PERCENT: 7 % (ref 24–43)
MCH RBC QN AUTO: 31.2 PG (ref 25.2–33.5)
MCHC RBC AUTO-ENTMCNC: 33.8 G/DL (ref 28.4–34.8)
MCV RBC AUTO: 92.2 FL (ref 82.6–102.9)
MONOCYTES NFR BLD: 0.64 K/UL (ref 0.1–1.2)
MONOCYTES NFR BLD: 9 % (ref 3–12)
MORPHOLOGY: NORMAL
NEUTROPHILS NFR BLD: 80 % (ref 36–65)
NEUTS SEG NFR BLD: 5.68 K/UL (ref 1.5–8.1)
NRBC BLD-RTO: 0 PER 100 WBC
PLATELET # BLD AUTO: 164 K/UL (ref 138–453)
PMV BLD AUTO: 8.7 FL (ref 8.1–13.5)
POTASSIUM SERPL-SCNC: 4.1 MMOL/L (ref 3.7–5.3)
PROT SERPL-MCNC: 6.5 G/DL (ref 6.6–8.7)
RBC # BLD AUTO: 3.59 M/UL (ref 4.21–5.77)
SODIUM SERPL-SCNC: 129 MMOL/L (ref 136–145)
WBC OTHER # BLD: 7.1 K/UL (ref 3.5–11.3)

## 2025-01-23 PROCEDURE — 80053 COMPREHEN METABOLIC PANEL: CPT

## 2025-01-23 PROCEDURE — 85025 COMPLETE CBC W/AUTO DIFF WBC: CPT

## 2025-01-23 PROCEDURE — 36415 COLL VENOUS BLD VENIPUNCTURE: CPT

## 2025-01-23 NOTE — TELEPHONE ENCOUNTER
Patient is scheduled on 2/4 with Dr EDDY for a greenlight procedure. Patient has an extensive cardiac/vascular history-recently diagnosed with lung cancer and undergoing radiation. He has had AAA repair in 2021, an ICD insertion, history of pulmonary hypertension, carotid stenosis- just to name a few. He had an echo done in 2023 with an EF of 15-20%. He sees vascular, cardiology, pulmonology all with current appointments within 6 months. Please review. Thank you!

## 2025-01-24 NOTE — TELEPHONE ENCOUNTER
We are going to recommend this patient be done at a tertiary care center. Based on his notes, he is not optimized enough to proceed at [facility].     Per a telephone call on 1/21/25 with his cardiology office, he is having SOB, weight gain, and leg swelling:  \"Patient's wife calling states that his feet are getting very swollen, to the point that they hurt. He has a catheter now so he has been drinking a lot of water to help him urinate per Urology to drink 10 glasses of water a day which he doesn't quite do but drinks much more. Weight is up about 20lbs at the urology office, she thinks this is over about 2 weeks. He is SOB as well. Bumex was decreased to every other day in November due to dizziness and low BP's. BP in the 90's systolic, still having occasional episodes where he gets really dizzy but not as often.\"    Per his vascular note:  \"This gentleman is here for surveillance of his carotid stenosis. He is known to have significant atherosclerotic plaque in his aortic arch which affects the circulation through his carotids and vertebrals. Because of this if he stands up quickly he may get lightheaded.\"  \"Although he has somewhat diminished perfusion to his brain, there would not be a straightforward reconstruction given the diffuse arch disease that affects all of the vessels. Once again, his medical comorbidities make any significant surgical reconstruction contraindicated.\"

## 2025-02-04 ENCOUNTER — OFFICE VISIT (OUTPATIENT)
Dept: UROLOGY | Age: 75
End: 2025-02-04
Payer: MEDICARE

## 2025-02-04 VITALS
TEMPERATURE: 97.8 F | HEIGHT: 67 IN | WEIGHT: 182 LBS | BODY MASS INDEX: 28.56 KG/M2 | DIASTOLIC BLOOD PRESSURE: 60 MMHG | SYSTOLIC BLOOD PRESSURE: 98 MMHG

## 2025-02-04 DIAGNOSIS — R33.9 INCOMPLETE BLADDER EMPTYING: ICD-10-CM

## 2025-02-04 DIAGNOSIS — R33.9 URINARY RETENTION: Primary | ICD-10-CM

## 2025-02-04 DIAGNOSIS — N40.1 BPH WITH OBSTRUCTION/LOWER URINARY TRACT SYMPTOMS: ICD-10-CM

## 2025-02-04 DIAGNOSIS — N13.8 BPH WITH OBSTRUCTION/LOWER URINARY TRACT SYMPTOMS: ICD-10-CM

## 2025-02-04 PROCEDURE — 51798 US URINE CAPACITY MEASURE: CPT | Performed by: NURSE PRACTITIONER

## 2025-02-04 PROCEDURE — 1159F MED LIST DOCD IN RCRD: CPT | Performed by: NURSE PRACTITIONER

## 2025-02-04 PROCEDURE — 1123F ACP DISCUSS/DSCN MKR DOCD: CPT | Performed by: NURSE PRACTITIONER

## 2025-02-04 PROCEDURE — 99213 OFFICE O/P EST LOW 20 MIN: CPT | Performed by: NURSE PRACTITIONER

## 2025-02-04 PROCEDURE — 3074F SYST BP LT 130 MM HG: CPT | Performed by: NURSE PRACTITIONER

## 2025-02-04 PROCEDURE — 3078F DIAST BP <80 MM HG: CPT | Performed by: NURSE PRACTITIONER

## 2025-02-04 NOTE — PROGRESS NOTES
History  7/2021 developed postoperative retention following a AAA repair with an endovascular graft and open exploration of endovascular AAA repair in 2 to continuous endoleak.              Started on Flomax     [facility].  Rehab facility attempted to remove the Hurst catheter, but patient was unable to urinate              Increased Flomax to twice per day     10/2021 cystoscopy showed bilobar hyperplasia with high riding bladder neck, minimal hyperplasia              PVP greenlight offered, but patient declined     Known incomplete bladder emptying    1/2025 urinary retention. Cystoscopy showed trilobar hyperplasia   Hurst removed     Scheduled for PVP greenlight    Today  Here today to follow-up for BPH.  He was originally scheduled for PVP greenlight, but due to his multiple comorbidities our anesthesia team felt he would be best cared for at a tertiary facility. We did refer patient to [facility] per their request. They have not scheduled an apt as of today. He is voiding without difficulty. Random bladder scan is low, 142ml.    Plan  Continue flomax BID    F/U with urology at tertiary facility    F/U in 3-4 months or sooner if needed

## 2025-04-18 ENCOUNTER — TELEPHONE (OUTPATIENT)
Dept: UROLOGY | Age: 75
End: 2025-04-18

## 2025-04-18 NOTE — TELEPHONE ENCOUNTER
We would not recommend reducing his Flomax at this time.  Patient does need prostate surgery.  Patient also incompletely empties his bladder.  Our fear is if we reduce the Flomax to daily he would require catheter placement.  Has he been evaluated by urology at tertiary care center?  We were unable to do his procedure in [place] secondary to his multiple comorbidities.  We did previously place a referral for tertiary care urology.    If they are concerned about his blood pressure we do have on his medication list, he is on diuretics and high blood pressure medication.  We do recommend that they reach out to primary care or the cardiologist who is prescribing these medications to see if there is some adjustment that could be made.

## 2025-04-18 NOTE — TELEPHONE ENCOUNTER
Message read to Dionne.  She reports he is very tired and lightheaded and his pressures are 80s/40s.  Enc her to call his PCP and/or ER visit.  She recently had a GI Bleed one month ago that he was hospitalized for.       Per wife- NO they have not reached out to urology because the cardiologist advised for him not to be put under anesthesia.

## 2025-04-18 NOTE — TELEPHONE ENCOUNTER
Patient's wife calls in asking if his tamsulosin can be changed to once a day. She reports he is taking it twice a day now, and it lowers his BP.  Please advise.     Last visit 2/4/25

## 2025-04-21 NOTE — TELEPHONE ENCOUNTER
Phone call to the patient and spoke with his wife, Dionne.  Dionne reports the patient was feeling better over the weekend and his BP stablized.  This nurse advised Dionne to take the patient to the ER if the symptoms of weakness, light-headedness and low BP reoccur.  Dionne verbalizes understanding of all information.

## 2025-08-22 ENCOUNTER — TELEPHONE (OUTPATIENT)
Dept: UROLOGY | Age: 75
End: 2025-08-22